# Patient Record
Sex: FEMALE | Race: WHITE | NOT HISPANIC OR LATINO | Employment: FULL TIME | URBAN - METROPOLITAN AREA
[De-identification: names, ages, dates, MRNs, and addresses within clinical notes are randomized per-mention and may not be internally consistent; named-entity substitution may affect disease eponyms.]

---

## 2017-01-05 ENCOUNTER — TRANSFERRED RECORDS (OUTPATIENT)
Dept: SURGERY | Facility: CLINIC | Age: 47
End: 2017-01-05

## 2017-04-05 ENCOUNTER — TRANSFERRED RECORDS (OUTPATIENT)
Dept: SURGERY | Facility: CLINIC | Age: 47
End: 2017-04-05

## 2017-06-28 ENCOUNTER — TRANSFERRED RECORDS (OUTPATIENT)
Dept: SURGERY | Facility: CLINIC | Age: 47
End: 2017-06-28

## 2017-07-31 ENCOUNTER — APPOINTMENT (OUTPATIENT)
Age: 47
Setting detail: DERMATOLOGY
End: 2017-08-19

## 2017-07-31 DIAGNOSIS — D18.0 HEMANGIOMA: ICD-10-CM

## 2017-07-31 DIAGNOSIS — D22 MELANOCYTIC NEVI: ICD-10-CM

## 2017-07-31 DIAGNOSIS — L73.8 OTHER SPECIFIED FOLLICULAR DISORDERS: ICD-10-CM

## 2017-07-31 DIAGNOSIS — L82.1 OTHER SEBORRHEIC KERATOSIS: ICD-10-CM

## 2017-07-31 DIAGNOSIS — L72.0 EPIDERMAL CYST: ICD-10-CM

## 2017-07-31 DIAGNOSIS — L70.0 ACNE VULGARIS: ICD-10-CM

## 2017-07-31 DIAGNOSIS — L81.4 OTHER MELANIN HYPERPIGMENTATION: ICD-10-CM

## 2017-07-31 PROBLEM — D18.01 HEMANGIOMA OF SKIN AND SUBCUTANEOUS TISSUE: Status: ACTIVE | Noted: 2017-07-31

## 2017-07-31 PROBLEM — D22.5 MELANOCYTIC NEVI OF TRUNK: Status: ACTIVE | Noted: 2017-07-31

## 2017-07-31 PROCEDURE — OTHER MIPS QUALITY: OTHER

## 2017-07-31 PROCEDURE — 99214 OFFICE O/P EST MOD 30 MIN: CPT

## 2017-07-31 PROCEDURE — OTHER DEFER: OTHER

## 2017-07-31 PROCEDURE — OTHER PRESCRIPTION: OTHER

## 2017-07-31 PROCEDURE — OTHER COUNSELING: OTHER

## 2017-07-31 RX ORDER — TRETINOIN 0.25 MG/G
0.025% CREAM TOPICAL QHS
Qty: 45 | Refills: 2 | Status: ERX | COMMUNITY
Start: 2017-07-31

## 2017-07-31 ASSESSMENT — LOCATION DETAILED DESCRIPTION DERM
LOCATION DETAILED: RIGHT INFERIOR UPPER BACK
LOCATION DETAILED: RIGHT MEDIAL UPPER BACK
LOCATION DETAILED: RIGHT MEDIAL UPPER BACK
LOCATION DETAILED: SUPERIOR THORACIC SPINE
LOCATION DETAILED: LEFT INFERIOR FOREHEAD
LOCATION DETAILED: RIGHT MEDIAL FOREHEAD

## 2017-07-31 ASSESSMENT — LOCATION ZONE DERM
LOCATION ZONE: TRUNK
LOCATION ZONE: TRUNK
LOCATION ZONE: FACE

## 2017-07-31 ASSESSMENT — LOCATION SIMPLE DESCRIPTION DERM
LOCATION SIMPLE: RIGHT UPPER BACK
LOCATION SIMPLE: LEFT FOREHEAD
LOCATION SIMPLE: RIGHT FOREHEAD
LOCATION SIMPLE: RIGHT UPPER BACK
LOCATION SIMPLE: UPPER BACK

## 2017-07-31 NOTE — PROCEDURE: DEFER
Detail Level: Zone
Procedure To Be Performed At Next Visit: Laser: Pulse dye laser 595nm
Instructions (Optional): [facility]

## 2017-07-31 NOTE — PROCEDURE: MIPS QUALITY
Detail Level: Detailed
Quality 130: Documentation Of Current Medications In The Medical Record: Current Medications Documented
Quality 110: Preventive Care And Screening: Influenza Immunization: Influenza Immunization previously received during influenza season
Quality 226: Preventive Care And Screening: Tobacco Use: Screening And Cessation Intervention: Patient screened for tobacco and never smoked
Quality 431: Preventive Care And Screening: Unhealthy Alcohol Use - Screening: Patient screened for unhealthy alcohol use using a single question and scores less than 2 times per year
Quality 131: Pain Assessment And Follow-Up: Pain assessment using a standardized tool is documented as negative, no follow-up plan required

## 2017-09-20 ENCOUNTER — TRANSFERRED RECORDS (OUTPATIENT)
Dept: SURGERY | Facility: CLINIC | Age: 47
End: 2017-09-20

## 2017-12-15 ENCOUNTER — TRANSFERRED RECORDS (OUTPATIENT)
Dept: SURGERY | Facility: CLINIC | Age: 47
End: 2017-12-15

## 2018-03-15 ENCOUNTER — TRANSFERRED RECORDS (OUTPATIENT)
Dept: SURGERY | Facility: CLINIC | Age: 48
End: 2018-03-15

## 2018-06-19 ENCOUNTER — TRANSFERRED RECORDS (OUTPATIENT)
Dept: SURGERY | Facility: CLINIC | Age: 48
End: 2018-06-19

## 2018-09-11 ENCOUNTER — TRANSFERRED RECORDS (OUTPATIENT)
Dept: SURGERY | Facility: CLINIC | Age: 48
End: 2018-09-11

## 2018-11-19 ENCOUNTER — OFFICE VISIT (OUTPATIENT)
Dept: ENDOCRINOLOGY | Facility: CLINIC | Age: 48
End: 2018-11-19
Payer: COMMERCIAL

## 2018-11-19 VITALS
DIASTOLIC BLOOD PRESSURE: 67 MMHG | HEIGHT: 65 IN | HEART RATE: 55 BPM | BODY MASS INDEX: 23.63 KG/M2 | WEIGHT: 141.8 LBS | SYSTOLIC BLOOD PRESSURE: 107 MMHG

## 2018-11-19 DIAGNOSIS — E03.9 HYPOTHYROIDISM, UNSPECIFIED TYPE: Primary | ICD-10-CM

## 2018-11-19 DIAGNOSIS — Z83.3 FAMILY HISTORY OF DIABETES MELLITUS: ICD-10-CM

## 2018-11-19 LAB
GLUCOSE SERPL-MCNC: 88 MG/DL (ref 70–99)
T4 FREE SERPL-MCNC: 0.84 NG/DL (ref 0.76–1.46)
TSH SERPL DL<=0.005 MIU/L-ACNC: 5.15 MU/L (ref 0.4–4)

## 2018-11-19 PROCEDURE — 36415 COLL VENOUS BLD VENIPUNCTURE: CPT | Performed by: INTERNAL MEDICINE

## 2018-11-19 PROCEDURE — 82947 ASSAY GLUCOSE BLOOD QUANT: CPT | Performed by: INTERNAL MEDICINE

## 2018-11-19 PROCEDURE — 86376 MICROSOMAL ANTIBODY EACH: CPT | Performed by: INTERNAL MEDICINE

## 2018-11-19 PROCEDURE — 84439 ASSAY OF FREE THYROXINE: CPT | Performed by: INTERNAL MEDICINE

## 2018-11-19 PROCEDURE — 99204 OFFICE O/P NEW MOD 45 MIN: CPT | Performed by: INTERNAL MEDICINE

## 2018-11-19 PROCEDURE — 84443 ASSAY THYROID STIM HORMONE: CPT | Performed by: INTERNAL MEDICINE

## 2018-11-19 RX ORDER — VENLAFAXINE 75 MG/1
TABLET ORAL
COMMUNITY
Start: 2018-11-12

## 2018-11-19 NOTE — PROGRESS NOTES
Name: Heather Barros is a 48 year old, seen at the request of Dr. Ana Rosa Bueno for evaluation of     Chief Complaint   Patient presents with     Thyroid Problem     low TSH level at OBGYN clinic       HPI:  Recent issues:  Here for further evaluation of recent abnormal thyroid test and symptoms  Had recommendation to see endocrinologist by Dr. AMANDA Bueno  She also learned of our clinic from her father, Mr Davidson Swanson.        11/2014. Previous diagnosis of breast cancer  Had medical evaluation with Dr. Toya Hernandez/[facility]  Double mastectomy surgery, then chemotherapy and radiation treatments  Details of breast cancer diagnosis, management, and lab testing not available.    7/19/18. Medical evaluation with Dr. Ana Rosa Bueno/OBGYN  Progressive symptoms of fatigue, weight gain, constipation  Labs:  TSH 4.75 uIU/mL (nl 0.36-3.74), Free T4 0.81 ng/dL, Free T3 2.56 pg/mL  No previous history of thyroid goiter, nodules, or thyroid medication use  Fam Hx:   Hypothyroidism Sister   Parathyroid dis MGM  Other chronic illnesses include:   Breast cancer:   Takes Zoladex and Aromasin, followed by medical oncologist   Depression:     Takes Effexor medication, followed by PCP/GYN   Anemia   Followed by PCP/GYN      , lives in [facility], works at [employer]  Sees Dr. Ana Rosa Bueno for general medicine evaluations.  Has also seen Dr. Toya Hernandez/[facility]    PMH/PSH:  Past Medical History:   Diagnosis Date     Acne      Anemia     slight     Anxiety      Breast cancer (H)      Breast mass      Depression      Drug-induced osteoporosis      Dyspareunia      Polyarthralgia      Ulcer      Past Surgical History:   Procedure Laterality Date     BIOPSY       DISSECT LYMPH NODE AXILLA Right 12/3/2014    Procedure: DISSECT LYMPH NODE AXILLA;  Surgeon: Padmini James MD;  Location: [facility] OR     GENITOURINARY SURGERY      10 yrs old  Reimplantation of bilateral ureters     INSERT PORT VASCULAR  ACCESS Left 12/22/2014    Procedure: INSERT PORT VASCULAR ACCESS;  Surgeon: Padmini aJmes MD;  Location: [facility] OR     INSERT TISSUE EXPANDER BREAST BILATERAL Bilateral 12/3/2014    Procedure: INSERT TISSUE EXPANDER BREAST BILATERAL;  Surgeon: Lizy Livingston MD;  Location: [facility] OR     MASTECTOMY SIMPLE BILATERAL, SENTINEL NODE BILATERAL, COMBINED Bilateral 12/3/2014    Procedure: COMBINED MASTECTOMY SIMPLE BILATERAL, SENTINEL NODE BILATERAL;  Surgeon: Padmini James MD;  Location: [facility] OR     RECONSTRUCT BREAST BILATERAL, IMPLANT PROSTHESIS BILATERAL, COMBINED Bilateral 12/21/2015    Procedure: COMBINED RECONSTRUCT BREAST BILATERAL, IMPLANT PROSTHESIS BILATERAL;  Surgeon: Lizy Livingston MD;  Location: [facility]     REMOVE PORT VASCULAR ACCESS N/A 1/15/2016    Procedure: REMOVE PORT VASCULAR ACCESS;  Surgeon: Billy Steele MD;  Location: [facility]       Family Hx:  Family History   Problem Relation Age of Onset     Diabetes Father      Thyroid Disease Sister          Social Hx:  Social History     Social History     Marital status:      Spouse name: N/A     Number of children: N/A     Years of education: N/A     Occupational History     Not on file.     Social History Main Topics     Smoking status: Former Smoker     Smokeless tobacco: Never Used      Comment: quit after college     Alcohol use Yes      Comment: 3/week     Drug use: No     Sexual activity: Not on file     Other Topics Concern     Not on file     Social History Narrative          MEDICATIONS:  has a current medication list which includes the following prescription(s): acetaminophen, calcium carb-cholecalciferol, exemestane, fexofenadine hcl, ibuprofen, magnesium, ospemifene, venlafaxine, venlafaxine, and goserelin acetate.    ROS:     ROS: 10 point ROS neg other than the symptoms noted above in the HPI.    GENERAL:  fatigue, wt gain 10#/1 yr; denies fevers, chills, night sweats.   HEENT: no dysphagia, odonophagia, diplopia, neck  "pain  THYROID:  no apparent hyper or hypothyroid symptoms  CV: no chest pain, pressure, palpitations  LUNGS: no SOB, CHARLES, cough, wheezing   ABDOMEN: constipation; denies diarrhea, nausea, abdominal pain  EXTREMITIES: no rashes, ulcers, edema  NEUROLOGY: no headaches, denies changes in vision, tingling, extremitiy numbness   MSK: no muscle aches or pains, weakness  SKIN: some acne, denies rash  : no menses, has hot flashes  PSYCH:  stable mood, no significant anxiety or depression  ENDOCRINE: no heat or cold intolerance    Physical Exam   VS: /67  Pulse 55  Ht 1.651 m (5' 5\")  Wt 64.3 kg (141 lb 12.8 oz)  BMI 23.6 kg/m2  GENERAL: AXOX3, NAD, well dressed, answering questions appropriately, appears stated age.  THYROID:  normal gland, no apparent nodules or goiter  HEENT: neck non-tender, no exopthalmous, no proptosis, EOMI  CV: RRR, no rubs, gallops, no murmurs  LUNGS: CTAB, no wheezes, rales, or ronchi  ABDOMEN: soft, nontender, nondistended  EXTREMITIES: no edema, +pedal pulses, no lesions  NEUROLOGY: CN grossly intact, no tremors  MSK: grossly intact  SKIN: no apparent facial acne, rashes or skin lesions    LABS:    All pertinent notes, labs, and images personally reviewed by me.     A/P:  Encounter Diagnoses   Name Primary?     Hypothyroidism, unspecified type Yes     Family history of diabetes mellitus      Comments:  Reviewed health history and hypothyroidism issues.  Recent thyroid tests at OBGYN clinic and symptoms indicate mild primary hypothyroidism    Plan:  Discussed general issues with the hypothyroidism diagnosis and management  Reviewed thyroid gland anatomy and hormone physiology  Discussed lab tests used to assess patient thyroid hormone levels  Reviewed treatment options including levothyroxine medication, ideal dosing    Recommend:  Repeat thyroid tests, including TPOAb  Reviewed management option using low dose levothyroxine medication  Monitor for symptom changes  Will summarize test " results and recommendations soon  Contact me if questions about evaluation and plan    Discussed importance of having a primary care practitioner for general medicine appointments and also acute care visits.  Addressed patient questions today    Labs ordered today:   Orders Placed This Encounter   Procedures     T4 free     TSH     Thyroid peroxidase antibody     Glucose     Radiology/Consults ordered today: None    More than 50% of the time spent with Mrs. Barros on counseling / coordinating her care.  Total appointment time was 50 minutes.    Follow-up:  ALBERT Babin MD  Endocrinology  [facility]  CC: China Bueno

## 2018-11-19 NOTE — MR AVS SNAPSHOT
"              After Visit Summary   11/19/2018    Heather Barros    MRN: [id number]           Patient Information     Date Of Birth          1970        Visit Information        Provider Department      11/19/2018 10:30 AM Mamadou Babin MD [facility]        Today's Diagnoses     Hypothyroidism, unspecified type    -  1    Family history of diabetes mellitus           Follow-ups after your visit        Who to contact     If you have questions or need follow up information about today's clinic visit or your schedule please contact [facility] directly at [phone].  Normal or non-critical lab and imaging results will be communicated to you by MyChart, letter or phone within 4 business days after the clinic has received the results. If you do not hear from us within 7 days, please contact the clinic through MyChart or phone. If you have a critical or abnormal lab result, we will notify you by phone as soon as possible.  Submit refill requests through [employer] or call your pharmacy and they will forward the refill request to us. Please allow 3 business days for your refill to be completed.          Additional Information About Your Visit        Care EveryWhere ID     This is your Care EveryWhere ID. This could be used by other organizations to access your [facility] medical records  [id number]        Your Vitals Were     Pulse Height BMI (Body Mass Index)             55 1.651 m (5' 5\") 23.6 kg/m2          Blood Pressure from Last 3 Encounters:   11/19/18 107/67   01/15/16 106/58   12/21/15 122/77    Weight from Last 3 Encounters:   11/19/18 64.3 kg (141 lb 12.8 oz)   01/15/16 61.1 kg (134 lb 12.8 oz)   12/21/15 60.6 kg (133 lb 11.2 oz)              We Performed the Following     Glucose     T4 free     Thyroid peroxidase antibody     TSH        Primary Care Provider Office Phone # Fax #    China Miriam Bueno [phone] [address] " [phone]        Equal Access to Services     [facility] : Hadii aad ku hadkiarrajj Taylerali, wamejiada angelocrissyha, qasusanneta kacarlosangel alanis, tanja baugh. So [facility] [phone].    ATENCIÓN: Si habla español, tiene a ann disposición servicios gratuitos de asistencia lingüística. Dottieame al [phone].    We comply with applicable federal civil rights laws and [place] laws. We do not discriminate on the basis of race, color, national origin, age, disability, sex, sexual orientation, or gender identity.            Thank you!     Thank you for choosing [facility]  for your care. Our goal is always to provide you with excellent care. Hearing back from our patients is one way we can continue to improve our services. Please take a few minutes to complete the written survey that you may receive in the mail after your visit with us. Thank you!             Your Updated Medication List - Protect others around you: Learn how to safely use, store and throw away your medicines at www.disposemymeds.org.          This list is accurate as of 11/19/18  6:58 PM.  Always use your most recent med list.                   Brand Name Dispense Instructions for use Diagnosis    ADVIL PO      Take 800 mg by mouth every 8 hours as needed for moderate pain        ALLEGRA ALLERGY PO      Take 60 mg by mouth daily as needed        Calcium Carb-Cholecalciferol 600-800 MG-UNIT Tabs      Take by mouth daily        exemestane 25 MG tablet    AROMASIN     Take 25 mg by mouth daily        magnesium 250 MG tablet      Take 400 tablets by mouth daily        ospemifene 60 MG tablet    OSPHENA     Take 60 mg by mouth every morning        TYLENOL PO      Take 500 mg by mouth as needed for mild pain or fever        * venlafaxine 37.5 MG 24 hr capsule    EFFEXOR-XR     Take 75 mg by mouth daily        * venlafaxine 75 MG tablet    EFFEXOR          ZOLADEX SC      Inject Subcutaneous every 3 months        * Notice:  This  list has 2 medication(s) that are the same as other medications prescribed for you. Read the directions carefully, and ask your doctor or other care provider to review them with you.

## 2018-11-20 LAB — THYROPEROXIDASE AB SERPL-ACNC: <10 IU/ML

## 2018-11-23 DIAGNOSIS — E03.9 HYPOTHYROIDISM, UNSPECIFIED TYPE: Primary | ICD-10-CM

## 2018-11-23 RX ORDER — LEVOTHYROXINE SODIUM 25 UG/1
25 TABLET ORAL DAILY
Qty: 30 TABLET | Refills: 11 | Status: SHIPPED | OUTPATIENT
Start: 2018-11-23 | End: 2020-01-29

## 2018-12-04 ENCOUNTER — TRANSFERRED RECORDS (OUTPATIENT)
Dept: SURGERY | Facility: CLINIC | Age: 48
End: 2018-12-04

## 2019-02-25 ENCOUNTER — TRANSFERRED RECORDS (OUTPATIENT)
Dept: SURGERY | Facility: CLINIC | Age: 49
End: 2019-02-25

## 2019-08-30 ENCOUNTER — TRANSFERRED RECORDS (OUTPATIENT)
Dept: SURGERY | Facility: CLINIC | Age: 49
End: 2019-08-30

## 2020-05-08 ENCOUNTER — VIRTUAL VISIT (OUTPATIENT)
Dept: ENDOCRINOLOGY | Facility: CLINIC | Age: 50
End: 2020-05-08
Payer: COMMERCIAL

## 2020-05-08 DIAGNOSIS — E03.9 HYPOTHYROIDISM, UNSPECIFIED TYPE: Primary | ICD-10-CM

## 2020-05-08 DIAGNOSIS — E03.9 HYPOTHYROIDISM, UNSPECIFIED TYPE: ICD-10-CM

## 2020-05-08 PROCEDURE — 99213 OFFICE O/P EST LOW 20 MIN: CPT | Mod: 95 | Performed by: INTERNAL MEDICINE

## 2020-05-08 RX ORDER — LEVOTHYROXINE SODIUM 25 UG/1
25 TABLET ORAL DAILY
Qty: 30 TABLET | Refills: 2 | Status: SHIPPED | OUTPATIENT
Start: 2020-05-08 | End: 2020-07-29

## 2020-05-08 RX ORDER — TAMOXIFEN CITRATE 20 MG/1
TABLET ORAL
COMMUNITY
Start: 2020-02-16 | End: 2024-05-22

## 2020-05-08 NOTE — PROGRESS NOTES
"Heather Barros is a 49 year old female who is being evaluated via a billable video visit.      The patient has been notified of following:     \"This video visit will be conducted via a call between you and your physician/provider. We have found that certain health care needs can be provided without the need for an in-person physical exam.  This service lets us provide the care you need with a video conversation.  If a prescription is necessary we can send it directly to your pharmacy.  If lab work is needed we can place an order for that and you can then stop by our lab to have the test done at a later time.    Video visits are billed at different rates depending on your insurance coverage.  Please reach out to your insurance provider with any questions.    If during the course of the call the physician/provider feels a video visit is not appropriate, you will not be charged for this service.\"    Patient has given verbal consent for Video visit? Yes    How would you like to obtain your AVS? Reviewed verbally    Patient would like the video invitation sent by: Text to cell phone: [phone]    Will anyone else be joining your video visit? no      Recent issues:  Thyroid followup.  Patient feeling well overall, success with wt loss on levothyroxine med plan            11/2014. Previous diagnosis of breast cancer  Had medical evaluation with Dr. Toya Hernandez/[facility]  Double mastectomy surgery, then chemotherapy and radiation treatments  Details of breast cancer diagnosis, management, and lab testing not available.     7/19/18. Medical evaluation with Dr. Ana Rosa Bueno/OBGYN  Progressive symptoms of fatigue, weight gain, constipation  Labs:  TSH 4.75 uIU/mL (nl 0.36-3.74), Free T4 0.81 ng/dL, Free T3 2.56 pg/mL  No previous history of thyroid goiter, nodules, or thyroid medication use  Fam Hx:              Hypothyroidism           Sister              Parathyroid dis            MGM    Recent [facility] labs include:  Lab " Results   Component Value Date    TSH 5.15 (H) 11/19/2018    T4 0.84 11/19/2018    TPO <10 11/19/2018     Current dose:  Levothyroxine 0.025 mg daily        , lives in [facility], works at [employer]  Sees Dr. Ana Rosa Bueno for general medicine evaluations.  Has also seen Dr. Toya Hernandez/[facility]      ROS: 10 point ROS neg other than the symptoms noted above in the HPI.     GENERAL:  less, intentional wt loss then some recent wt regain; denies fevers, chills, night sweats.   HEENT: no dysphagia, odonophagia, diplopia, neck pain  THYROID:  no apparent hyper or hypothyroid symptoms  CV: no chest pain, pressure, palpitations  LUNGS: no SOB, CHARLES, cough, wheezing   ABDOMEN: constipation; denies diarrhea, nausea, abdominal pain  EXTREMITIES: occasional left hand tremors; no rashes, ulcers, edema  NEUROLOGY: no headaches, denies changes in vision, tingling, extremitiy numbness   MSK: no muscle aches or pains, weakness  SKIN: some acne, denies rash  : no menses, has hot flashes  PSYCH:  stable mood, no significant anxiety or depression  ENDOCRINE: no heat or cold intolerance     Physical Exam (visual exam)  VS:  no vital signs taken for video visit  GENERAL: healthy, alert and NAD, well dressed, answering questions appropriately  EYES: eyeglasses, eyes grossly normal to inspection, conjunctivae and sclerae normal, no exophthalmos or proptosis  THYROID:  no apparent nodules or goiter  LUNGS: no audible wheeze, cough or visible cyanosis, no visible retractions or increased work of breathing  ABDOMEN: abdomen normal size  EXTREMITIES: no hand tremors, limited exam  NEUROLOGY: CN grossly intact, mentation intact and speech normal   PSYCH: mentation appears normal, affect normal/bright, judgement and insight intact, normal speech and appearance well groomed       LABS:     All pertinent notes, labs, and images personally reviewed by me.      A/P:       Encounter Diagnoses   Name      Hypothyroidism,  unspecified type      Family history of diabetes mellitus      Comments:  Reviewed health history and hypothyroidism issues.     Plan:  Discussed general issues with the hypothyroidism diagnosis and management  Discussed lab tests used to assess patient thyroid hormone levels  Reviewed treatment options including levothyroxine medication, ideal dosing     Recommend:  Continue current levothyroxine 0.025 mg daily dose plan  Will update this Rx to her pharmacy today  Plan lab appt at her nearby [facility] lab soon, lab orders placed  Monitor for symptom changes  We discussed general issues with her father's diabetes management also  Contact me if questions about evaluation and plan     Discussed importance of having a primary care practitioner for general medicine appointments and also acute care visits.  Addressed patient questions today        More than 50% of the time spent with Mrs. Barros on counseling / coordinating her care.  Total appointment time was 22 minutes.     Follow-up:  1 yr     DARRYL Babin MD, MS  Endocrinology  [facility]    CC:  TRINA Bueno      Video-Visit Details    Type of service:  Video Visit    Video Start Time: 8:58 am  Video End Time: 9:20 am    Originating Location (pt. Location): home    Distant Location (provider location):  [facility]     Platform used for Video Visit: [employer]

## 2020-07-27 ENCOUNTER — TELEPHONE (OUTPATIENT)
Dept: ENDOCRINOLOGY | Facility: CLINIC | Age: 50
End: 2020-07-27

## 2020-07-27 DIAGNOSIS — E03.9 HYPOTHYROIDISM, UNSPECIFIED TYPE: ICD-10-CM

## 2020-07-29 DIAGNOSIS — E03.9 HYPOTHYROIDISM, UNSPECIFIED TYPE: ICD-10-CM

## 2020-07-29 RX ORDER — LEVOTHYROXINE SODIUM 25 UG/1
25 TABLET ORAL DAILY
Qty: 30 TABLET | Refills: 3 | Status: SHIPPED | OUTPATIENT
Start: 2020-07-29 | End: 2020-09-15

## 2020-08-01 RX ORDER — LEVOTHYROXINE SODIUM 25 UG/1
TABLET ORAL
Qty: 30 TABLET | Refills: 0 | Status: SHIPPED | OUTPATIENT
Start: 2020-08-01 | End: 2020-08-31

## 2020-08-01 NOTE — TELEPHONE ENCOUNTER
I received a refill request for patient's levothyroxine 0.025 mg medication, and have now refilled it #30, R0.  At the 5/2020 appt, she was advised to have a lab appointment at her nearby [facility] lab and the lab orders were placed.  She has not done this and I have not seen any thyroid lab results since 2018.    Please contact patient to remind her to do the lab appt for thyroid testing ASAP.  I will not be able to do additional levothyroxine Rx's if I don't have thyroid lab results to confirm the appropriate dose.  Thanks.    DARRYL Babin MD, MS  Endocrinology  [facility]

## 2020-08-03 NOTE — TELEPHONE ENCOUNTER
Patient notified and agrees to get her labs done asap (prior to further refill)    Bryanna Marie RN on 8/3/2020 at 9:37 AM

## 2020-08-28 DIAGNOSIS — E03.9 HYPOTHYROIDISM, UNSPECIFIED TYPE: ICD-10-CM

## 2020-08-28 LAB
T4 FREE SERPL-MCNC: 1.05 NG/DL (ref 0.76–1.46)
TSH SERPL DL<=0.005 MIU/L-ACNC: 5.05 MU/L (ref 0.4–4)

## 2020-08-28 PROCEDURE — 36415 COLL VENOUS BLD VENIPUNCTURE: CPT | Performed by: INTERNAL MEDICINE

## 2020-08-28 PROCEDURE — 84439 ASSAY OF FREE THYROXINE: CPT | Performed by: INTERNAL MEDICINE

## 2020-08-28 PROCEDURE — 84443 ASSAY THYROID STIM HORMONE: CPT | Performed by: INTERNAL MEDICINE

## 2020-09-10 ENCOUNTER — TELEPHONE (OUTPATIENT)
Dept: ENDOCRINOLOGY | Facility: CLINIC | Age: 50
End: 2020-09-10

## 2020-09-10 DIAGNOSIS — E03.9 HYPOTHYROIDISM, UNSPECIFIED TYPE: ICD-10-CM

## 2020-09-10 NOTE — TELEPHONE ENCOUNTER
Reason for Call:  Other     Detailed comments: pt called back regarding note on last results. She would like to talk to Dr Babin (or team) regarding the recommended dose increase. She wants to clarify how to increase the dose (mentioned alternating days at different doses)    Phone Number Patient can be reached at: [phone]      Best Time: any (please no today 9/10)    Can we leave a detailed message on this number? YES    Call taken on 9/10/2020 at 12:42 PM by Cash Nieto

## 2020-09-10 NOTE — TELEPHONE ENCOUNTER
Called Pt:    No answer, left VM to return call to clinic.     Erin ORDOÑEZ RN         From Lab Letter dated 8/31/2020:     These results indicate your overall thyroid hormone levels are borderline low.  Management options include 1) continuing the current levothyroxine 0.025 mg daily dose or 2) dose increase to levothyroxine 0.0375 mg (alternating the levothyroxine 0.025 mg as 1 tablet vs 2-tablets every-other-day).       Contact me with your thyroid medication dose preferences soon.     DARRYL Babin MD, MS  Endocrinology  [facility]

## 2020-09-15 NOTE — TELEPHONE ENCOUNTER
Spoke with patient     She is wondering if instead of alternating 0.025mg with 1 tablet vs 2 tablets every other day, if she can just do the 2 tablets Mon, Wed, Fri, then 1 tablet other days of the week (would think this is okay as the total dosing she is getting is between the 1 tablet every day and 1 tab alternating with 2 tabs every other day?) - this is because     She would only need a call back if this is not okay, otherwise, new dosing pended - she'd like an updated Rx sent     Thank you  Shoshana STANTON RN

## 2020-09-17 RX ORDER — LEVOTHYROXINE SODIUM 25 UG/1
TABLET ORAL
Qty: 40 TABLET | Refills: 5 | Status: SHIPPED | OUTPATIENT
Start: 2020-09-17 | End: 2021-03-31

## 2020-09-17 NOTE — TELEPHONE ENCOUNTER
Message noted.  I agree with this modified thyroid med dose plan as listed.  I have sent the updated levothyroxine Rx to her pharmacy, then called her and left a voice mail message with this plan.    DARRYL Babin MD, MS  Endocrinology  [facility]

## 2021-03-31 ENCOUNTER — IMMUNIZATION (OUTPATIENT)
Dept: PEDIATRICS | Facility: CLINIC | Age: 51
End: 2021-03-31
Payer: COMMERCIAL

## 2021-03-31 DIAGNOSIS — E03.9 HYPOTHYROIDISM, UNSPECIFIED TYPE: ICD-10-CM

## 2021-03-31 PROCEDURE — 91300 PR COVID VAC PFIZER DIL RECON 30 MCG/0.3 ML IM: CPT

## 2021-03-31 PROCEDURE — 0001A PR COVID VAC PFIZER DIL RECON 30 MCG/0.3 ML IM: CPT

## 2021-03-31 NOTE — TELEPHONE ENCOUNTER
LOV 5-8-2020 TL, follow up one year  No future OV scheduled    Pharm note given  Does not appear patient uses her My[facility]    Yoselin Cunningham, RT (R)

## 2021-04-01 NOTE — TELEPHONE ENCOUNTER
"Routing refill request to provider for review/approval because:  Labs out of range:  TSH        Requested Prescriptions   Pending Prescriptions Disp Refills     levothyroxine (SYNTHROID/LEVOTHROID) 25 MCG tablet 40 tablet 1     Sig: Take 2 tablets (50mcg) by mouth on Mondays, Wednesdays, Fridays, and 1 tablet all other days       Thyroid Protocol Failed - 3/31/2021  9:45 AM        Failed - Normal TSH on file in past 12 months     Recent Labs   Lab Test 08/28/20  1446   TSH 5.05*              Passed - Patient is 12 years or older        Passed - Recent (12 mo) or future (30 days) visit within the authorizing provider's specialty     Patient has had an office visit with the authorizing provider or a provider within the authorizing providers department within the previous 12 mos or has a future within next 30 days. See \"Patient Info\" tab in inbasket, or \"Choose Columns\" in Meds & Orders section of the refill encounter.              Passed - Medication is active on med list        Passed - No active pregnancy on record     If patient is pregnant or has had a positive pregnancy test, please check TSH.          Passed - No positive pregnancy test in past 12 months     If patient is pregnant or has had a positive pregnancy test, please check TSH.               "

## 2021-04-02 RX ORDER — LEVOTHYROXINE SODIUM 25 UG/1
TABLET ORAL
Qty: 120 TABLET | Refills: 0 | Status: SHIPPED | OUTPATIENT
Start: 2021-04-02 | End: 2021-06-09

## 2021-04-21 ENCOUNTER — IMMUNIZATION (OUTPATIENT)
Dept: PEDIATRICS | Facility: CLINIC | Age: 51
End: 2021-04-21
Attending: INTERNAL MEDICINE
Payer: COMMERCIAL

## 2021-04-21 PROCEDURE — 91300 PR COVID VAC PFIZER DIL RECON 30 MCG/0.3 ML IM: CPT

## 2021-04-21 PROCEDURE — 0002A PR COVID VAC PFIZER DIL RECON 30 MCG/0.3 ML IM: CPT

## 2021-05-15 ENCOUNTER — HEALTH MAINTENANCE LETTER (OUTPATIENT)
Age: 51
End: 2021-05-15

## 2021-06-03 ENCOUNTER — TELEPHONE (OUTPATIENT)
Dept: ENDOCRINOLOGY | Facility: CLINIC | Age: 51
End: 2021-06-03

## 2021-06-03 DIAGNOSIS — E03.9 HYPOTHYROIDISM, UNSPECIFIED TYPE: ICD-10-CM

## 2021-06-03 NOTE — TELEPHONE ENCOUNTER
Last Written Prescription Date:  4/2/2021  Last Fill Quantity: 120,  # refills: 0   Last office visit: Visit date not found with prescribing provider:  5/08/2020   Future Office Visit:        Requested Prescriptions   Pending Prescriptions Disp Refills     levothyroxine (SYNTHROID/LEVOTHROID) 25 MCG tablet 120 tablet 0     Sig: Take 2 tablets (50mcg) by mouth on Mondays, Wednesdays, Fridays, and 1 tablet all other days       There is no refill protocol information for this order

## 2021-06-09 RX ORDER — LEVOTHYROXINE SODIUM 25 UG/1
TABLET ORAL
Qty: 126 TABLET | Refills: 0 | Status: SHIPPED | OUTPATIENT
Start: 2021-06-09 | End: 2021-06-26

## 2021-06-21 DIAGNOSIS — E03.9 HYPOTHYROIDISM, UNSPECIFIED TYPE: ICD-10-CM

## 2021-06-21 LAB
T4 FREE SERPL-MCNC: 0.92 NG/DL (ref 0.76–1.46)
TSH SERPL DL<=0.005 MIU/L-ACNC: 3.22 MU/L (ref 0.4–4)

## 2021-06-21 PROCEDURE — 36415 COLL VENOUS BLD VENIPUNCTURE: CPT | Performed by: INTERNAL MEDICINE

## 2021-06-21 PROCEDURE — 84443 ASSAY THYROID STIM HORMONE: CPT | Performed by: INTERNAL MEDICINE

## 2021-06-21 PROCEDURE — 84439 ASSAY OF FREE THYROXINE: CPT | Performed by: INTERNAL MEDICINE

## 2021-06-26 DIAGNOSIS — E03.9 HYPOTHYROIDISM, UNSPECIFIED TYPE: ICD-10-CM

## 2021-06-26 RX ORDER — LEVOTHYROXINE SODIUM 50 UG/1
TABLET ORAL
Qty: 30 TABLET | Refills: 5 | Status: SHIPPED | OUTPATIENT
Start: 2021-06-26 | End: 2021-07-21

## 2021-06-29 RX ORDER — LEVOTHYROXINE SODIUM 50 UG/1
TABLET ORAL
Qty: 90 TABLET | OUTPATIENT
Start: 2021-06-29

## 2021-07-21 ENCOUNTER — VIRTUAL VISIT (OUTPATIENT)
Dept: ENDOCRINOLOGY | Facility: CLINIC | Age: 51
End: 2021-07-21
Payer: COMMERCIAL

## 2021-07-21 DIAGNOSIS — E03.9 HYPOTHYROIDISM, UNSPECIFIED TYPE: Primary | ICD-10-CM

## 2021-07-21 PROCEDURE — 99213 OFFICE O/P EST LOW 20 MIN: CPT | Mod: 95 | Performed by: INTERNAL MEDICINE

## 2021-07-21 RX ORDER — LEVOTHYROXINE SODIUM 50 UG/1
TABLET ORAL
Qty: 90 TABLET | Refills: 3 | Status: SHIPPED | OUTPATIENT
Start: 2021-07-21 | End: 2022-07-22

## 2021-07-21 NOTE — PROGRESS NOTES
Patient is being evaluated via a billable video visit.      How would you like to obtain your AVS? Reviewed verbally  If the video visit is dropped, the invitation should be resent by cell phone  Will anyone else be joining your video visit? no      Video Start Time:  2:30 pm    Video-Visit Details    Type of service:  Video Visit    Video End Time:  2:43 pm    Originating Location (pt. Location): home    Distant Location (provider location):  [facility]/home    Platform used for Video Visit:  [employer] Mobile        Recent issues:  Thyroid followup.  Patient feeling well overall, initial success with wt loss on levothyroxine med plan  No new health issues reported          11/2014. Previous diagnosis of breast cancer  Had medical evaluation with Dr. Toya Hernandez/[facility]  Double mastectomy surgery, then chemotherapy and radiation treatments  Details of breast cancer diagnosis, management, and lab testing not available.     7/19/18. Medical evaluation with Dr. Ana Rosa Bueno/[facility]  Progressive symptoms of fatigue, weight gain, constipation  Labs:  TSH 4.75 uIU/mL (nl 0.36-3.74), Free T4 0.81 ng/dL, Free T3 2.56 pg/mL  No previous history of thyroid goiter, nodules, or thyroid medication use  Fam Hx:              Hypothyroidism           Sister              Parathyroid dis            MGM      11/19/18. Initial thyroid evaluation with me at [place]  Reviewed health history and thyroid issues  Started low dose levothyroxine, then gradual dose increases    Recent [facility] labs include:  Lab Results   Component Value Date    TSH 3.22 06/21/2021    T4 0.92 06/21/2021    TPO <10 11/19/2018     Current dose:  Levothyroxine 0.05 mg daily        , lives in [facility], works at [employer] in [place]  Sees Dr. Ana Rosa Bueno for general medicine evaluations.  Has also seen Dr. Toya Hernandez/[facility]      ROS: 10 point ROS neg other than the symptoms noted above in the HPI.     GENERAL:   energy good, wt stable; denies fevers, chills, night sweats.   HEENT: no dysphagia, odonophagia, diplopia, neck pain  THYROID:  no apparent hyper or hypothyroid symptoms  CV: no chest pain, pressure, palpitations  LUNGS: no SOB, CHARLES, cough, wheezing   ABDOMEN: previous constipation; denies diarrhea, nausea, abdominal pain  EXTREMITIES: occasional left hand tremors; no rashes, ulcers, edema  NEUROLOGY: no headaches, denies changes in vision, tingling, extremitiy numbness   MSK: no muscle aches or pains, weakness  SKIN: some acne, denies rash  : no menses, has hot flashes  PSYCH:  stable mood, no significant anxiety or depression  ENDOCRINE: no heat or cold intolerance     Physical Exam (visual exam)  VS:  no vital signs taken for video visit  CONSTITUTIONAL: healthy, alert and NAD, well dressed, answering questions appropriately  ENT: no nose swelling or nasal discharge, mouth redness or gum changes.  EYES: eyes grossly normal to inspection, conjunctivae and sclerae normal, no exophthalmos or proptosis  THYROID:  no apparent nodules or goiter  LUNGS: no audible wheeze, cough or visible cyanosis, no visible retractions or increased work of breathing  ABDOMEN: abdomen not evaluated  EXTREMITIES: no hand tremors, limited exam  NEUROLOGY: CN grossly intact, mentation intact and speech normal   SKIN:  no apparent skin lesions, rash, or edema with visualized skin appearance  PSYCH: mentation appears normal, affect normal/bright, judgement and insight intact,   normal speech and appearance well groomed       LABS:     All pertinent notes, labs, and images personally reviewed by me.     A/P:  Encounter Diagnosis   Name Primary?     Hypothyroidism, unspecified type Yes     Comments:  Reviewed health history and hypothyroidism issues.  Reviewed and interpreted tests that I previously ordered.   Ordered appropriate tests for the endocrinology disease management.    Management options discussed and implemented after shared  medical decision making with the patient.  Hypothyroidism problem is chronic-stable     Plan:  Discussed general issues with the hypothyroidism diagnosis and management  Discussed lab tests used to assess patient thyroid hormone levels  Reviewed treatment options including levothyroxine medication, ideal dosing     Recommend:  Continue current levothyroxine 0.05 mg daily dose plan  Will update this Rx to her pharmacy today  Plan repeat thyroid lab testing in late 9/2021 or early 10/2021   Discussed the nearby [facility] lab soon   Lab orders placed  Monitor for symptom changes  Keep focus on diet, exercise, weight management  Contact me if questions about evaluation and plan     Addressed patient questions today     There are no Patient Instructions on file for this visit.    Future labs ordered today:   Orders Placed This Encounter   Procedures     TSH     T4 free     Radiology/Consults ordered today: None    Total time spent in with the patient evaluation:  13 min  Additional time spent reviewing pertinent lab tests and chart notes, and documentation:  9 min    Follow-up:  7/2022    DARRYL Babin MD, MS  Endocrinology  [facility]

## 2021-07-21 NOTE — LETTER
7/21/2021         RE: Heather Barros  [address]  [place] [address]        Dear Colleague,    Thank you for referring your patient, Heather Barros, to the [facility]. Please see a copy of my visit note below.    Patient is being evaluated via a billable video visit.      How would you like to obtain your AVS? Reviewed verbally  If the video visit is dropped, the invitation should be resent by cell phone  Will anyone else be joining your video visit? no      Video Start Time:  2:30 pm    Video-Visit Details    Type of service:  Video Visit    Video End Time:  2:43 pm    Originating Location (pt. Location): home    Distant Location (provider location):  [facility]/home    Platform used for Video Visit:  Next Caller        Recent issues:  Thyroid followup.  Patient feeling well overall, initial success with wt loss on levothyroxine med plan  No new health issues reported          11/2014. Previous diagnosis of breast cancer  Had medical evaluation with Dr. Toya Hernandez/[facility]  Double mastectomy surgery, then chemotherapy and radiation treatments  Details of breast cancer diagnosis, management, and lab testing not available.     7/19/18. Medical evaluation with Dr. Ana Rosa Bueno/[facility]  Progressive symptoms of fatigue, weight gain, constipation  Labs:  TSH 4.75 uIU/mL (nl 0.36-3.74), Free T4 0.81 ng/dL, Free T3 2.56 pg/mL  No previous history of thyroid goiter, nodules, or thyroid medication use  Fam Hx:              Hypothyroidism           Sister              Parathyroid dis            MGM      11/19/18. Initial thyroid evaluation with me at [place]  Reviewed health history and thyroid issues  Started low dose levothyroxine, then gradual dose increases    Recent [facility] labs include:  Lab Results   Component Value Date    TSH 3.22 06/21/2021    T4 0.92 06/21/2021    TPO <10 11/19/2018     Current dose:  Levothyroxine 0.05 mg  daily        , lives in [facility], works at [employer] in [place]  Sees Dr. Ana Rosa Bueno for general medicine evaluations.  Has also seen Dr. Toya Hernandez/[facility]      ROS: 10 point ROS neg other than the symptoms noted above in the HPI.     GENERAL:  energy good, wt stable; denies fevers, chills, night sweats.   HEENT: no dysphagia, odonophagia, diplopia, neck pain  THYROID:  no apparent hyper or hypothyroid symptoms  CV: no chest pain, pressure, palpitations  LUNGS: no SOB, CHARLES, cough, wheezing   ABDOMEN: previous constipation; denies diarrhea, nausea, abdominal pain  EXTREMITIES: occasional left hand tremors; no rashes, ulcers, edema  NEUROLOGY: no headaches, denies changes in vision, tingling, extremitiy numbness   MSK: no muscle aches or pains, weakness  SKIN: some acne, denies rash  : no menses, has hot flashes  PSYCH:  stable mood, no significant anxiety or depression  ENDOCRINE: no heat or cold intolerance     Physical Exam (visual exam)  VS:  no vital signs taken for video visit  CONSTITUTIONAL: healthy, alert and NAD, well dressed, answering questions appropriately  ENT: no nose swelling or nasal discharge, mouth redness or gum changes.  EYES: eyes grossly normal to inspection, conjunctivae and sclerae normal, no exophthalmos or proptosis  THYROID:  no apparent nodules or goiter  LUNGS: no audible wheeze, cough or visible cyanosis, no visible retractions or increased work of breathing  ABDOMEN: abdomen not evaluated  EXTREMITIES: no hand tremors, limited exam  NEUROLOGY: CN grossly intact, mentation intact and speech normal   SKIN:  no apparent skin lesions, rash, or edema with visualized skin appearance  PSYCH: mentation appears normal, affect normal/bright, judgement and insight intact,   normal speech and appearance well groomed       LABS:     All pertinent notes, labs, and images personally reviewed by me.     A/P:  Encounter Diagnosis   Name Primary?     Hypothyroidism,  unspecified type Yes     Comments:  Reviewed health history and hypothyroidism issues.  Reviewed and interpreted tests that I previously ordered.   Ordered appropriate tests for the endocrinology disease management.    Management options discussed and implemented after shared medical decision making with the patient.  Hypothyroidism problem is chronic-stable     Plan:  Discussed general issues with the hypothyroidism diagnosis and management  Discussed lab tests used to assess patient thyroid hormone levels  Reviewed treatment options including levothyroxine medication, ideal dosing     Recommend:  Continue current levothyroxine 0.05 mg daily dose plan  Will update this Rx to her pharmacy today  Plan repeat thyroid lab testing in late 9/2021 or early 10/2021   Discussed the nearby [facility] lab soon   Lab orders placed  Monitor for symptom changes  Keep focus on diet, exercise, weight management  Contact me if questions about evaluation and plan     Addressed patient questions today     There are no Patient Instructions on file for this visit.    Future labs ordered today:   Orders Placed This Encounter   Procedures     TSH     T4 free     Radiology/Consults ordered today: None    Total time spent in with the patient evaluation:  13 min  Additional time spent reviewing pertinent lab tests and chart notes, and documentation:  9 min    Follow-up:  7/2022    DARRYL Babin MD, MS  Endocrinology  [facility]                      Again, thank you for allowing me to participate in the care of your patient.        Sincerely,        Mamadou Babin MD

## 2021-08-09 DIAGNOSIS — E03.9 HYPOTHYROIDISM, UNSPECIFIED TYPE: ICD-10-CM

## 2021-08-09 RX ORDER — LEVOTHYROXINE SODIUM 25 UG/1
TABLET ORAL
Qty: 126 TABLET | Refills: 0 | OUTPATIENT
Start: 2021-08-09

## 2021-09-04 ENCOUNTER — HEALTH MAINTENANCE LETTER (OUTPATIENT)
Age: 51
End: 2021-09-04

## 2022-02-25 ENCOUNTER — TRANSFERRED RECORDS (OUTPATIENT)
Dept: SURGERY | Facility: CLINIC | Age: 52
End: 2022-02-25
Payer: COMMERCIAL

## 2022-06-11 ENCOUNTER — HEALTH MAINTENANCE LETTER (OUTPATIENT)
Age: 52
End: 2022-06-11

## 2022-07-22 DIAGNOSIS — E03.9 HYPOTHYROIDISM, UNSPECIFIED TYPE: ICD-10-CM

## 2022-07-22 RX ORDER — LEVOTHYROXINE SODIUM 50 UG/1
TABLET ORAL
Qty: 90 TABLET | Refills: 3 | Status: CANCELLED | OUTPATIENT
Start: 2022-07-22

## 2022-07-22 RX ORDER — LEVOTHYROXINE SODIUM 50 UG/1
TABLET ORAL
Qty: 90 TABLET | Refills: 0 | Status: SHIPPED | OUTPATIENT
Start: 2022-07-22 | End: 2022-10-28

## 2022-07-22 NOTE — TELEPHONE ENCOUNTER
"Requested Prescriptions   Pending Prescriptions Disp Refills     levothyroxine (SYNTHROID/LEVOTHROID) 50 MCG tablet 90 tablet 3     Sig: Take 1-tablet (50 mcg) by mouth daily as directed       Thyroid Protocol Failed - 7/22/2022 10:13 AM        Failed - Recent (12 mo) or future (30 days) visit within the authorizing provider's specialty     Patient has had an office visit with the authorizing provider or a provider within the authorizing providers department within the previous 12 mos or has a future within next 30 days. See \"Patient Info\" tab in inbasket, or \"Choose Columns\" in Meds & Orders section of the refill encounter.              Failed - Normal TSH on file in past 12 months     Recent Labs   Lab Test 06/21/21  1143   TSH 3.22              Passed - Patient is 12 years or older        Passed - Medication is active on med list        Passed - No active pregnancy on record     If patient is pregnant or has had a positive pregnancy test, please check TSH.          Passed - No positive pregnancy test in past 12 months     If patient is pregnant or has had a positive pregnancy test, please check TSH.             Last office visit 7/21/21  No future appts scheduled.  Pt is due back this month.  Will send refill with note to pharmacy that appt needed.  Linsey Miller RN    "

## 2022-07-22 NOTE — TELEPHONE ENCOUNTER
Last Written Prescription Date:  7/21/2021  Last Fill Quantity: 90,  # refills: 3   Last office visit: Visit date not found with prescribing provider:     Future Office Visit:        Requested Prescriptions   Pending Prescriptions Disp Refills     levothyroxine (SYNTHROID/LEVOTHROID) 50 MCG tablet 90 tablet 3     Sig: Take 1-tablet (50 mcg) by mouth daily as directed       There is no refill protocol information for this order

## 2022-10-16 ENCOUNTER — HEALTH MAINTENANCE LETTER (OUTPATIENT)
Age: 52
End: 2022-10-16

## 2022-10-27 DIAGNOSIS — E03.9 HYPOTHYROIDISM, UNSPECIFIED TYPE: ICD-10-CM

## 2022-10-27 NOTE — TELEPHONE ENCOUNTER
Last Written Prescription Date:  7/22/2022  Last Fill Quantity: 90,  # refills: 0   Last office visit: Visit date not found with prescribing provider:  7/21/2021    Future Office Visit:      Requested Prescriptions   Pending Prescriptions Disp Refills     levothyroxine (SYNTHROID/LEVOTHROID) 50 MCG tablet 90 tablet 0     Sig: Take 1-tablet (50 mcg) by mouth daily as directed       There is no refill protocol information for this order

## 2022-10-28 RX ORDER — LEVOTHYROXINE SODIUM 50 UG/1
TABLET ORAL
Qty: 90 TABLET | Refills: 0 | Status: SHIPPED | OUTPATIENT
Start: 2022-10-28 | End: 2022-12-04

## 2022-10-28 NOTE — TELEPHONE ENCOUNTER
"Requested Prescriptions   Pending Prescriptions Disp Refills     levothyroxine (SYNTHROID/LEVOTHROID) 50 MCG tablet 90 tablet 0     Sig: Take 1-tablet (50 mcg) by mouth daily as directed       Thyroid Protocol Failed - 10/27/2022  3:45 PM        Failed - Recent (12 mo) or future (30 days) visit within the authorizing provider's specialty     Patient has had an office visit with the authorizing provider or a provider within the authorizing providers department within the previous 12 mos or has a future within next 30 days. See \"Patient Info\" tab in inbasket, or \"Choose Columns\" in Meds & Orders section of the refill encounter.              Failed - Normal TSH on file in past 12 months     Recent Labs   Lab Test 06/21/21  1143   TSH 3.22              Passed - Patient is 12 years or older        Passed - Medication is active on med list        Passed - No active pregnancy on record     If patient is pregnant or has had a positive pregnancy test, please check TSH.          Passed - No positive pregnancy test in past 12 months     If patient is pregnant or has had a positive pregnancy test, please check TSH.           .  Future appt: 11/1/22  Refill sent per protocol  Linsey Miller RN    "

## 2022-11-01 ENCOUNTER — VIRTUAL VISIT (OUTPATIENT)
Dept: ENDOCRINOLOGY | Facility: CLINIC | Age: 52
End: 2022-11-01
Payer: COMMERCIAL

## 2022-11-01 DIAGNOSIS — E03.9 HYPOTHYROIDISM, UNSPECIFIED TYPE: Primary | ICD-10-CM

## 2022-11-01 PROCEDURE — 99213 OFFICE O/P EST LOW 20 MIN: CPT | Mod: 95 | Performed by: INTERNAL MEDICINE

## 2022-11-01 NOTE — LETTER
11/1/2022         RE: Heather Barros  [address]  [place] [address]        Dear Colleague,    Thank you for referring your patient, Heather Barros, to the [facility]. Please see a copy of my visit note below.    Patient is being evaluated via a billable video visit.      How would you like to obtain your AVS? Verbally Reviewed  If the video visit is dropped, the invitation should be resent by: Cellphone  Will anyone else be joining your video visit? No        Video-Visit Details    Video Start Time:  3:00 pm    Type of service:  Video Visit    Video End Time:  3:15 pm    Originating Location (pt. Location): Home    PROVIDER LOCATION On-site vs Off-site    Distant Location (provider location):  Home    Platform used for Video Visit: Truist        Recent issues:  Thyroid followup.  Taking the levothyroxine medication regularly  Patient feeling well overall, no new health issues reported          11/2014. Previous diagnosis of breast cancer  Had medical evaluation with Dr. Toya Hernandez/[facility]  Double mastectomy surgery, then chemotherapy and radiation treatments  Details of breast cancer diagnosis, management, and lab testing not available.     7/19/18. Medical evaluation with Dr. Ana Rosa Bueno/[facility]  Progressive symptoms of fatigue, weight gain, constipation  Labs:  TSH 4.75 uIU/mL (nl 0.36-3.74), Free T4 0.81 ng/dL, Free T3 2.56 pg/mL  No previous history of thyroid goiter, nodules, or thyroid medication use  Fam Hx:              Hypothyroidism           Sister              Parathyroid dis            MGM      11/19/18. Initial thyroid evaluation with me at [place]  Reviewed health history and thyroid issues  Started low dose levothyroxine, then gradual dose increases  Recent [facility] labs include:  Lab Results   Component Value Date    TSH 3.22 06/21/2021    T4 0.92 06/21/2021    TPO <10 11/19/2018     Current dose:  Levothyroxine 0.05 mg daily        , lives  in [facility], previously worked at [employer] in [place]  Sees Dr. Ana Rosa Bueno for general medicine evaluations.  Has also seen Dr. Toya Hernandez/[facility]      PMH/PSH:  Past Medical History:   Diagnosis Date     Acne      Anemia     slight     Anxiety      Breast cancer (H)      Breast mass      Depression      Drug-induced osteoporosis      Dyspareunia      Hypothyroidism      Polyarthralgia      Ulcer      Past Surgical History:   Procedure Laterality Date     BIOPSY       DISSECT LYMPH NODE AXILLA Right 12/3/2014    Procedure: DISSECT LYMPH NODE AXILLA;  Surgeon: Padmini James MD;  Location: [facility] OR     GENITOURINARY SURGERY      10 yrs old  Reimplantation of bilateral ureters     INSERT PORT VASCULAR ACCESS Left 12/22/2014    Procedure: INSERT PORT VASCULAR ACCESS;  Surgeon: Padmini James MD;  Location: [facility] OR     INSERT TISSUE EXPANDER BREAST BILATERAL Bilateral 12/3/2014    Procedure: INSERT TISSUE EXPANDER BREAST BILATERAL;  Surgeon: Lizy Livingston MD;  Location: [facility] OR     MASTECTOMY SIMPLE BILATERAL, SENTINEL NODE BILATERAL, COMBINED Bilateral 12/3/2014    Procedure: COMBINED MASTECTOMY SIMPLE BILATERAL, SENTINEL NODE BILATERAL;  Surgeon: Padmini James MD;  Location: [facility] OR     RECONSTRUCT BREAST BILATERAL, IMPLANT PROSTHESIS BILATERAL, COMBINED Bilateral 12/21/2015    Procedure: COMBINED RECONSTRUCT BREAST BILATERAL, IMPLANT PROSTHESIS BILATERAL;  Surgeon: Lizy Livingston MD;  Location: [facility] [place]     REMOVE PORT VASCULAR ACCESS N/A 1/15/2016    Procedure: REMOVE PORT VASCULAR ACCESS;  Surgeon: Billy Steele MD;  Location: [facility] [place]       Family Hx:  Family History   Problem Relation Age of Onset     Diabetes Father      Thyroid Disease Sister          Social Hx:  Social History     Socioeconomic History     Marital status:      Spouse name: Not on file     Number of children: Not on file     Years of education: Not on file     Highest education level: Not on  file   Occupational History     Not on file   Tobacco Use     Smoking status: Former     Smokeless tobacco: Never     Tobacco comments:     quit after college   Substance and Sexual Activity     Alcohol use: Yes     Comment: 3/week     Drug use: No     Sexual activity: Not on file   Other Topics Concern     Parent/sibling w/ CABG, MI or angioplasty before 65F 55M? Not Asked   Social History Narrative     Not on file     Social Determinants of Health     Financial Resource Strain: Not on file   Food Insecurity: Not on file   Transportation Needs: Not on file   Physical Activity: Not on file   Stress: Not on file   Social Connections: Not on file   Intimate Partner Violence: Not on file   Housing Stability: Not on file          MEDICATIONS:  has a current medication list which includes the following prescription(s): acetaminophen, calcium carb-cholecalciferol, fexofenadine hcl, ibuprofen, levothyroxine, magnesium, venlafaxine, venlafaxine, and tamoxifen.    ROS: 10 point ROS neg other than the symptoms noted above in the HPI.     GENERAL:  energy good, wt stable; denies fevers, chills, night sweats.   HEENT: no dysphagia, odonophagia, diplopia, neck pain  THYROID:  no apparent hyper or hypothyroid symptoms  CV: no chest pain, pressure, palpitations  LUNGS: no SOB, CHARLES, cough, wheezing   ABDOMEN: constipation; denies diarrhea, nausea, abdominal pain  EXTREMITIES: occasional left hand tremors; no rashes, ulcers, edema  NEUROLOGY: no headaches, denies changes in vision, tingling, extremitiy numbness   MSK: no muscle aches or pains, weakness  SKIN: some acne, denies rash  : no menses, has hot flashes  PSYCH:  stable mood, no significant anxiety or depression  ENDOCRINE: no heat or cold intolerance     Physical Exam (visual exam)  VS:  no vital signs taken for video visit  CONSTITUTIONAL: healthy, alert and NAD, well dressed, answering questions appropriately  ENT: no nose swelling or nasal discharge, mouth redness or  gum changes.  EYES: eyes grossly normal to inspection, conjunctivae and sclerae normal, no exophthalmos or proptosis  THYROID:  no apparent nodules or goiter  LUNGS: no audible wheeze, cough or visible cyanosis, no visible retractions or increased work of breathing  ABDOMEN: abdomen not evaluated  EXTREMITIES: no hand tremors, limited exam  NEUROLOGY: CN grossly intact, mentation intact and speech normal   SKIN:  no apparent skin lesions, rash, or edema with visualized skin appearance  PSYCH: mentation appears normal, affect normal/bright, judgement and insight intact,   normal speech and appearance well groomed       LABS:     All pertinent notes, labs, and images personally reviewed by me.     A/P:  Encounter Diagnosis   Name Primary?     Hypothyroidism, unspecified type Yes       Comments:  Reviewed health history and hypothyroidism issues.  Reviewed and interpreted tests that I previously ordered.   Ordered appropriate tests for the endocrinology disease management.    Management options discussed and implemented after shared medical decision making with the patient.  Hypothyroidism problem is chronic-stable     Plan:  Reviewed general issues with the hypothyroidism diagnosis and management  Discussed lab tests used to assess patient thyroid hormone levels  Reviewed treatment options including levothyroxine medication, ideal dosing     Recommend:  Continue the current levothyroxine 0.05 mg daily dose plan  Plan repeat thyroid lab testing soon   Discussed the nearby [facility] lab   Lab orders placed  Monitor for symptom changes  Will need updated levothyroxine Rx after reviewing lab results  Keep focus on diet, exercise, weight management  No thyroid U/S imaging needed at this time  Contact me if questions about evaluation and plan     Discussed future option of endocrinology appt for her father Davidson Swanson  Addressed patient questions today     There are no Patient Instructions on file for this  visit.    Future labs ordered today:   Orders Placed This Encounter   Procedures     TSH     T4 free     Radiology/Consults ordered today: None    Total time spent on day of encounter:  20 min    Follow-up:  11/2023    DARRYL Babin MD, MS  Endocrinology  [facility]    CC:  TRINA Bueno                        Again, thank you for allowing me to participate in the care of your patient.        Sincerely,        Mamadou Babin MD

## 2022-11-01 NOTE — PROGRESS NOTES
Patient is being evaluated via a billable video visit.      How would you like to obtain your AVS? Verbally Reviewed  If the video visit is dropped, the invitation should be resent by: Cellphone  Will anyone else be joining your video visit? No        Video-Visit Details    Video Start Time:  3:00 pm    Type of service:  Video Visit    Video End Time:  3:15 pm    Originating Location (pt. Location): Home    PROVIDER LOCATION On-site vs Off-site    Distant Location (provider location):  Home    Platform used for Video Visit: [employer]        Recent issues:  Thyroid followup.  Taking the levothyroxine medication regularly  Patient feeling well overall, no new health issues reported          11/2014. Previous diagnosis of breast cancer  Had medical evaluation with Dr. Toya Hernandez/[facility]  Double mastectomy surgery, then chemotherapy and radiation treatments  Details of breast cancer diagnosis, management, and lab testing not available.     7/19/18. Medical evaluation with Dr. Ana Rosa Bueno/[facility]  Progressive symptoms of fatigue, weight gain, constipation  Labs:  TSH 4.75 uIU/mL (nl 0.36-3.74), Free T4 0.81 ng/dL, Free T3 2.56 pg/mL  No previous history of thyroid goiter, nodules, or thyroid medication use  Fam Hx:              Hypothyroidism           Sister              Parathyroid dis            MGM      11/19/18. 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 Ana Rosa Bueno for general medicine evaluations.  Has also seen Dr. Toya Hernandez/[facility]      PMH/PSH:  Past Medical History:   Diagnosis Date     Acne      Anemia     slight      Anxiety      Breast cancer (H)      Breast mass      Depression      Drug-induced osteoporosis      Dyspareunia      Hypothyroidism      Polyarthralgia      Ulcer      Past Surgical History:   Procedure Laterality Date     BIOPSY       DISSECT LYMPH NODE AXILLA Right 12/3/2014    Procedure: DISSECT LYMPH NODE AXILLA;  Surgeon: Padmini James MD;  Location: [facility]     GENITOURINARY SURGERY      10 yrs old  Reimplantation of bilateral ureters     INSERT PORT VASCULAR ACCESS Left 12/22/2014    Procedure: INSERT PORT VASCULAR ACCESS;  Surgeon: Padmini James MD;  Location: [facility]     INSERT TISSUE EXPANDER BREAST BILATERAL Bilateral 12/3/2014    Procedure: INSERT TISSUE EXPANDER BREAST BILATERAL;  Surgeon: Lizy Livingston MD;  Location: [facility]     MASTECTOMY SIMPLE BILATERAL, SENTINEL NODE BILATERAL, COMBINED Bilateral 12/3/2014    Procedure: COMBINED MASTECTOMY SIMPLE BILATERAL, SENTINEL NODE BILATERAL;  Surgeon: Padmini James MD;  Location: [facility]     RECONSTRUCT BREAST BILATERAL, IMPLANT PROSTHESIS BILATERAL, COMBINED Bilateral 12/21/2015    Procedure: COMBINED RECONSTRUCT BREAST BILATERAL, IMPLANT PROSTHESIS BILATERAL;  Surgeon: Lizy Livingston MD;  Location: [facility]     REMOVE PORT VASCULAR ACCESS N/A 1/15/2016    Procedure: REMOVE PORT VASCULAR ACCESS;  Surgeon: Billy Steele MD;  Location: [facility]       Family Hx:  Family History   Problem Relation Age of Onset     Diabetes Father      Thyroid Disease Sister          Social Hx:  Social History     Socioeconomic History     Marital status:      Spouse name: Not on file     Number of children: Not on file     Years of education: Not on file     Highest education level: Not on file   Occupational History     Not on file   Tobacco Use     Smoking status: Former     Smokeless tobacco: Never     Tobacco comments:     quit after college   Substance and Sexual Activity     Alcohol use: Yes     Comment: 3/week     Drug use: No     Sexual  activity: Not on file   Other Topics Concern     Parent/sibling w/ CABG, MI or angioplasty before 65F 55M? Not Asked   Social History Narrative     Not on file     Social Determinants of Health     Financial Resource Strain: Not on file   Food Insecurity: Not on file   Transportation Needs: Not on file   Physical Activity: Not on file   Stress: Not on file   Social Connections: Not on file   Intimate Partner Violence: Not on file   Housing Stability: Not on file          MEDICATIONS:  has a current medication list which includes the following prescription(s): acetaminophen, calcium carb-cholecalciferol, fexofenadine hcl, ibuprofen, levothyroxine, magnesium, venlafaxine, venlafaxine, and tamoxifen.    ROS: 10 point ROS neg other than the symptoms noted above in the HPI.     GENERAL:  energy good, wt stable; denies fevers, chills, night sweats.   HEENT: no dysphagia, odonophagia, diplopia, neck pain  THYROID:  no apparent hyper or hypothyroid symptoms  CV: no chest pain, pressure, palpitations  LUNGS: no SOB, CHARLES, cough, wheezing   ABDOMEN: constipation; denies diarrhea, nausea, abdominal pain  EXTREMITIES: occasional left hand tremors; no rashes, ulcers, edema  NEUROLOGY: no headaches, denies changes in vision, tingling, extremitiy numbness   MSK: no muscle aches or pains, weakness  SKIN: some acne, denies rash  : no menses, has hot flashes  PSYCH:  stable mood, no significant anxiety or depression  ENDOCRINE: no heat or cold intolerance     Physical Exam (visual exam)  VS:  no vital signs taken for video visit  CONSTITUTIONAL: healthy, alert and NAD, well dressed, answering questions appropriately  ENT: no nose swelling or nasal discharge, mouth redness or gum changes.  EYES: eyes grossly normal to inspection, conjunctivae and sclerae normal, no exophthalmos or proptosis  THYROID:  no apparent nodules or goiter  LUNGS: no audible wheeze, cough or visible cyanosis, no visible retractions or increased work of  breathing  ABDOMEN: abdomen not evaluated  EXTREMITIES: no hand tremors, limited exam  NEUROLOGY: CN grossly intact, mentation intact and speech normal   SKIN:  no apparent skin lesions, rash, or edema with visualized skin appearance  PSYCH: mentation appears normal, affect normal/bright, judgement and insight intact,   normal speech and appearance well groomed       LABS:     All pertinent notes, labs, and images personally reviewed by me.     A/P:  Encounter Diagnosis   Name Primary?     Hypothyroidism, unspecified type Yes       Comments:  Reviewed health history and hypothyroidism issues.  Reviewed and interpreted tests that I previously ordered.   Ordered appropriate tests for the endocrinology disease management.    Management options discussed and implemented after shared medical decision making with the patient.  Hypothyroidism problem is chronic-stable     Plan:  Reviewed general issues with the hypothyroidism diagnosis and management  Discussed lab tests used to assess patient thyroid hormone levels  Reviewed treatment options including levothyroxine medication, ideal dosing     Recommend:  Continue the current levothyroxine 0.05 mg daily dose plan  Plan repeat thyroid lab testing soon   Discussed the nearby [facility] lab   Lab orders placed  Monitor for symptom changes  Will need updated levothyroxine Rx after reviewing lab results  Keep focus on diet, exercise, weight management  No thyroid U/S imaging needed at this time  Contact me if questions about evaluation and plan     Discussed future option of endocrinology appt for her father Davidson Swanson  Addressed patient questions today     There are no Patient Instructions on file for this visit.    Future labs ordered today:   Orders Placed This Encounter   Procedures     TSH     T4 free     Radiology/Consults ordered today: None    Total time spent on day of encounter:  20 min    Follow-up:  11/2023    DARRYL Babin MD, MS  Endocrinology  [facility]  [facility]    CC:  TRINA Bueno

## 2022-12-02 ENCOUNTER — LAB (OUTPATIENT)
Dept: LAB | Facility: CLINIC | Age: 52
End: 2022-12-02
Payer: COMMERCIAL

## 2022-12-02 DIAGNOSIS — E03.9 HYPOTHYROIDISM, UNSPECIFIED TYPE: ICD-10-CM

## 2022-12-02 LAB
T4 FREE SERPL-MCNC: 0.87 NG/DL (ref 0.76–1.46)
TSH SERPL DL<=0.005 MIU/L-ACNC: 2.9 MU/L (ref 0.4–4)

## 2022-12-02 PROCEDURE — 84443 ASSAY THYROID STIM HORMONE: CPT

## 2022-12-02 PROCEDURE — 36415 COLL VENOUS BLD VENIPUNCTURE: CPT

## 2022-12-02 PROCEDURE — 84439 ASSAY OF FREE THYROXINE: CPT

## 2022-12-04 DIAGNOSIS — E03.9 HYPOTHYROIDISM, UNSPECIFIED TYPE: ICD-10-CM

## 2022-12-04 RX ORDER — LEVOTHYROXINE SODIUM 50 UG/1
TABLET ORAL
Qty: 90 TABLET | Refills: 3 | Status: SHIPPED | OUTPATIENT
Start: 2022-12-04 | End: 2024-01-22

## 2023-02-27 ENCOUNTER — TRANSFERRED RECORDS (OUTPATIENT)
Dept: SURGERY | Facility: CLINIC | Age: 53
End: 2023-02-27
Payer: COMMERCIAL

## 2023-03-06 ENCOUNTER — E-VISIT (OUTPATIENT)
Dept: URGENT CARE | Facility: CLINIC | Age: 53
End: 2023-03-06
Payer: COMMERCIAL

## 2023-03-06 DIAGNOSIS — J01.90 ACUTE SINUSITIS WITH SYMPTOMS > 10 DAYS: Primary | ICD-10-CM

## 2023-03-06 PROCEDURE — 99421 OL DIG E/M SVC 5-10 MIN: CPT | Performed by: INTERNAL MEDICINE

## 2023-03-06 NOTE — PATIENT INSTRUCTIONS
Sinusitis (Antibiotic Treatment)    The sinuses are air-filled spaces within the bones of the face. They connect to the inside of the nose. Sinusitis is an inflammation of the tissue that lines the sinuses. Sinusitis can occur during a cold. It can also happen due to allergies to pollens and other particles in the air. Sinusitis can cause symptoms of sinus congestion and a feeling of fullness. A sinus infection causes fever, headache, and facial pain. There is often green or yellow fluid draining from the nose or into the back of the throat (post-nasal drip). You have been given antibiotics to treat this condition.   Home care    Take the full course of antibiotics as instructed. Don't stop taking them, even when you feel better.    Drink plenty of water, hot tea, and other liquids as directed by the healthcare provider. This may help thin nasal mucus. It also may help your sinuses drain fluids.    Heat may help soothe painful areas of your face. Use a towel soaked in hot water. Or,  the shower and direct the warm spray onto your face. Using a vaporizer along with a menthol rub at night may also help soothe symptoms.     An expectorant with guaifenesin may help thin nasal mucus and help your sinuses drain fluids. Talk with your provider or pharmacists before taking an over-the-counter (OTC) medicine if you have any questions about it or its side effects..    You can use an OTC decongestant, unless a similar medicine was prescribed to you. Nasal sprays work the fastest. Use one that contains phenylephrine or oxymetazoline. First blow your nose gently. Then use the spray. Don't use these medicines more often than directed on the label. If you do, your symptoms may get worse. You may also take pills that contain pseudoephedrine. Don t use products that combine multiple medicines. This is because side effects may be increased. Read labels. You can also ask the pharmacist for help. (People with high blood  pressure should not use decongestants. They can raise blood pressure.) Talk with your provider or pharmacist if you have any questions about the medicine..    OTC antihistamines may help if allergies contributed to your sinusitis. Talk with your provider or pharmacist if you have any questions about the medicine..    Don't use nasal rinses or irrigation during an acute sinus infection, unless your healthcare provider tells you to. Rinsing may spread the infection to other areas in your sinuses.    Use acetaminophen or ibuprofen to control pain, unless another pain medicine was prescribed to you. If you have chronic liver or kidney disease or ever had a stomach ulcer, talk with your healthcare provider before using these medicines. Never give aspirin to anyone under age 18 who is ill with a fever. It may cause severe liver damage.    Don't smoke. This can make symptoms worse.    Follow-up care  Follow up with your healthcare provider, or as advised.   When to seek medical advice  Call your healthcare provider if any of these occur:     Facial pain or headache that gets worse    Stiff neck    Unusual drowsiness or confusion    Swelling of your forehead or eyelids    Symptoms don't go away in 10 days    Vision problems, such as blurred or double vision    Fever of 100.4 F (38 C) or higher, or as directed by your healthcare provider  Call 911  Call 911 if any of these occur:     Seizure    Trouble breathing    Feeling dizzy or faint    Fingernails, skin or lips look blue, purple , or gray  Prevention  Here are steps you can take to help prevent an infection:     Keep good hand washing habits.    Don t have close contact with people who have sore throats, colds, or other upper respiratory infections.    Don t smoke, and stay away from secondhand smoke.    Stay up to date with of your vaccines.  [employer] last reviewed this educational content on 12/1/2019 2000-2021 [employer]. All rights reserved. This  information is not intended as a substitute for professional medical care. Always follow your healthcare professional's instructions.        Dear Heather Barros    After reviewing your responses, I've been able to diagnose you with a sinus infection     Based on your responses and diagnosis, I have prescribed Augmentin   to treat your symptoms. I have sent this to your pharmacy.?     It is also important to stay well hydrated, get lots of rest and take over-the-counter decongestants,?tylenol?or ibuprofen if you?are able to?take those medications per your primary care provider to help relieve discomfort.?     It is important that you take?all of?your prescribed medication even if your symptoms are improving after a few doses.? Taking?all of?your medicine helps prevent the symptoms from returning.?     If your symptoms worsen, you develop severe headache, vomiting, high fever (>102), or are not improving in 7 days, please contact your primary care provider for an appointment or visit any of our convenient Walk-in Care or Urgent Care Centers to be seen which can be found on our website?here.?     Thanks again for choosing?us?as your health care partner,?   ?  Jacqueline Garibay MD?

## 2023-06-01 ENCOUNTER — HEALTH MAINTENANCE LETTER (OUTPATIENT)
Age: 53
End: 2023-06-01

## 2023-11-02 ENCOUNTER — LAB REQUISITION (OUTPATIENT)
Dept: LAB | Facility: CLINIC | Age: 53
End: 2023-11-02

## 2023-11-02 DIAGNOSIS — R35.0 FREQUENCY OF MICTURITION: ICD-10-CM

## 2023-11-02 PROCEDURE — 87086 URINE CULTURE/COLONY COUNT: CPT | Performed by: OBSTETRICS & GYNECOLOGY

## 2023-11-04 LAB — BACTERIA UR CULT: ABNORMAL

## 2023-12-06 ENCOUNTER — TRANSFERRED RECORDS (OUTPATIENT)
Dept: SURGERY | Facility: CLINIC | Age: 53
End: 2023-12-06
Payer: COMMERCIAL

## 2024-01-22 DIAGNOSIS — E03.9 HYPOTHYROIDISM, UNSPECIFIED TYPE: ICD-10-CM

## 2024-01-22 NOTE — TELEPHONE ENCOUNTER
Requested Prescriptions   Pending Prescriptions Disp Refills    levothyroxine (SYNTHROID/LEVOTHROID) 50 MCG tablet 90 tablet 3     Sig: Take 1-tablet (50 mcg) by mouth daily as directed       There is no refill protocol information for this order            Last Written Prescription Date:  12/4/2022  Last Fill Quantity: 90 tablet,  # refills: 3   Last office visit: Visit date not found ; last virtual visit: 11/1/2022 with prescribing provider:  Mamadou Babin MD    Future Office Visit: None

## 2024-01-23 DIAGNOSIS — E03.9 HYPOTHYROIDISM, UNSPECIFIED TYPE: ICD-10-CM

## 2024-01-23 RX ORDER — LEVOTHYROXINE SODIUM 50 UG/1
TABLET ORAL
Qty: 30 TABLET | Refills: 0 | Status: SHIPPED | OUTPATIENT
Start: 2024-01-23 | End: 2024-04-11

## 2024-01-23 RX ORDER — LEVOTHYROXINE SODIUM 50 UG/1
TABLET ORAL
Qty: 90 TABLET | OUTPATIENT
Start: 2024-01-23

## 2024-01-23 NOTE — TELEPHONE ENCOUNTER
1 month refill sent. Mychart sent to pt to notify that appt needed for add'l refills. Linsey Miller RN

## 2024-02-07 ENCOUNTER — MYC MEDICAL ADVICE (OUTPATIENT)
Dept: ENDOCRINOLOGY | Facility: CLINIC | Age: 54
End: 2024-02-07
Payer: COMMERCIAL

## 2024-02-07 DIAGNOSIS — E03.9 HYPOTHYROIDISM, UNSPECIFIED TYPE: ICD-10-CM

## 2024-02-07 NOTE — TELEPHONE ENCOUNTER
Requested Prescriptions   Pending Prescriptions Disp Refills    levothyroxine (SYNTHROID/LEVOTHROID) 50 MCG tablet [Pharmacy Med Name: LEVOTHYROXINE 0.05MG (50MCG) TAB] 30 tablet 0     Sig: TAKE 1 TABLET(50 MCG) BY MOUTH DAILY AS DIRECTED       Thyroid Protocol Failed - 2/7/2024  8:10 AM        Failed - Recent (12 mo) or future (30 days) visit within the authorizing provider's specialty     The patient must have completed an in-person or virtual visit within the past 12 months or has a future visit scheduled within the next 90 days with the authorizing provider s specialty.  Urgent care and e-visits do not quality as an office visit for this protocol.          Failed - Normal TSH on file in past 12 months     Recent Labs   Lab Test 12/02/22  1026   TSH 2.90              Passed - Patient is 12 years or older        Passed - Medication is active on med list        Passed - No active pregnancy on record     If patient is pregnant or has had a positive pregnancy test, please check TSH.          Passed - No positive pregnancy test in past 12 months     If patient is pregnant or has had a positive pregnancy test, please check TSH.             Last Written Prescription Date:  1/23/24  Last Fill Quantity: 30 tab,  # refills: 0   Last office visit: Visit date not found ; last virtual visit: 11/1/2022 with prescribing provider:  Dr Babin   Future Office Visit:  Overdue as of 12/2023     Patient overdue for both labs and provider visit. Attempted to call patient to schedule but patient did not answer. LVM with instructions to read MCM sent today 2/7/24.    Demarcus Arreola RN

## 2024-02-12 RX ORDER — LEVOTHYROXINE SODIUM 50 UG/1
TABLET ORAL
Qty: 30 TABLET | Refills: 0 | OUTPATIENT
Start: 2024-02-12

## 2024-02-12 NOTE — TELEPHONE ENCOUNTER
Patient has not responded to VM or [employer] message. Rx declined. Patient needs follow-up.    Demarcus Arreola RN

## 2024-04-04 ENCOUNTER — LAB REQUISITION (OUTPATIENT)
Dept: LAB | Facility: CLINIC | Age: 54
End: 2024-04-04

## 2024-04-04 DIAGNOSIS — R63.5 ABNORMAL WEIGHT GAIN: ICD-10-CM

## 2024-04-04 DIAGNOSIS — Z01.419 ENCOUNTER FOR GYNECOLOGICAL EXAMINATION (GENERAL) (ROUTINE) WITHOUT ABNORMAL FINDINGS: ICD-10-CM

## 2024-04-04 LAB — HBA1C MFR BLD: 5.8 %

## 2024-04-04 PROCEDURE — G0145 SCR C/V CYTO,THINLAYER,RESCR: HCPCS | Performed by: OBSTETRICS & GYNECOLOGY

## 2024-04-04 PROCEDURE — 83036 HEMOGLOBIN GLYCOSYLATED A1C: CPT | Performed by: OBSTETRICS & GYNECOLOGY

## 2024-04-04 PROCEDURE — 87624 HPV HI-RISK TYP POOLED RSLT: CPT | Performed by: OBSTETRICS & GYNECOLOGY

## 2024-04-08 LAB
BKR LAB AP GYN ADEQUACY: NORMAL
BKR LAB AP GYN INTERPRETATION: NORMAL
BKR LAB AP HPV REFLEX: NORMAL
BKR LAB AP LMP: NORMAL
BKR LAB AP PREVIOUS ABNL DX: NORMAL
BKR LAB AP PREVIOUS ABNORMAL: NORMAL
PATH REPORT.COMMENTS IMP SPEC: NORMAL
PATH REPORT.COMMENTS IMP SPEC: NORMAL
PATH REPORT.RELEVANT HX SPEC: NORMAL

## 2024-04-09 LAB
HUMAN PAPILLOMA VIRUS 16 DNA: NEGATIVE
HUMAN PAPILLOMA VIRUS 18 DNA: NEGATIVE
HUMAN PAPILLOMA VIRUS FINAL DIAGNOSIS: NORMAL
HUMAN PAPILLOMA VIRUS OTHER HR: NEGATIVE

## 2024-04-10 DIAGNOSIS — E03.9 HYPOTHYROIDISM, UNSPECIFIED TYPE: ICD-10-CM

## 2024-04-10 RX ORDER — LEVOTHYROXINE SODIUM 50 UG/1
TABLET ORAL
Qty: 30 TABLET | Refills: 0 | OUTPATIENT
Start: 2024-04-10

## 2024-04-10 NOTE — TELEPHONE ENCOUNTER
Medication Question or Refill    Contacts         Type Contact Phone/Fax    04/10/2024 12:38 PM CDT Interface (Incoming) [id number] - [place], [place] - [address] [facility] [address] [phone]            What medication are you calling about (include dose and sig)?: Levothyroxine Sodium    Preferred Pharmacy:     [id number] - [place], [place] - [address] AT [facility] [address]  [facility] [address]  Phone: [phone] Fax: [phone]      Controlled Substance Agreement on file:   CSA -- Patient Level:    CSA: None found at the patient level.       Who prescribed the medication?: Dr. Babin    Do you need a refill? Yes      Do you have any questions or concerns?  Yes: Patient states that the refill was denied last time, wants to know if she can have prescribing MD changed       Could we send this information to you in [facility] or would you prefer to receive a phone call?:   Patient would prefer a phone call   Okay to leave a detailed message?: Yes at Cell number on file:    Telephone Information:   Mobile [phone]

## 2024-04-10 NOTE — TELEPHONE ENCOUNTER
Patient called and aware she needs to make a follow up appointment and get labs drawn in order to get refill approval for levothyroxine. Previous lab orders pended. Please review.     Zohreh Beard RN

## 2024-04-11 DIAGNOSIS — E03.9 HYPOTHYROIDISM, UNSPECIFIED TYPE: ICD-10-CM

## 2024-04-11 RX ORDER — LEVOTHYROXINE SODIUM 50 UG/1
TABLET ORAL
Qty: 30 TABLET | Refills: 1 | Status: SHIPPED | OUTPATIENT
Start: 2024-04-11 | End: 2024-05-22

## 2024-04-11 NOTE — TELEPHONE ENCOUNTER
The patient was able to schedule her follow up for 5/22/24. However, in the meantime she still needs a refill for this medication, The patient was hoping now that she's schedule if Dr. Rahman could enter the refill, please review and follow up thank you.

## 2024-04-11 NOTE — TELEPHONE ENCOUNTER
Levothyroxine refill denied.  Patient's last evaluation with me was long ago... 11/1/22 and no follow-up appointments.  She should have the levothyroxine refilled by PCP at this time.    DARRYL Babin MD, MS  Endocrinology  [facility]

## 2024-04-12 RX ORDER — LEVOTHYROXINE SODIUM 50 UG/1
TABLET ORAL
Qty: 90 TABLET | OUTPATIENT
Start: 2024-04-12

## 2024-04-12 RX ORDER — LEVOTHYROXINE SODIUM 50 UG/1
TABLET ORAL
Qty: 30 TABLET | Refills: 0 | OUTPATIENT
Start: 2024-04-12

## 2024-04-12 NOTE — TELEPHONE ENCOUNTER
Rec'd 90 day refill request for levo from [facility]- declined and notified that pt needs to come for appt next month to get add'l refills. Linsey Miller RN

## 2024-04-12 NOTE — TELEPHONE ENCOUNTER
Since patient has scheduled a follow-up endocrinology evaluation, I have signed the levothyroxine Rx (#30, R1) and placed thyroid lab orders.    DARRYL Babin MD, MS  Endocrinology  [facility]

## 2024-05-22 ENCOUNTER — VIRTUAL VISIT (OUTPATIENT)
Dept: ENDOCRINOLOGY | Facility: CLINIC | Age: 54
End: 2024-05-22
Payer: COMMERCIAL

## 2024-05-22 DIAGNOSIS — E03.9 HYPOTHYROIDISM, UNSPECIFIED TYPE: Primary | ICD-10-CM

## 2024-05-22 PROCEDURE — 99213 OFFICE O/P EST LOW 20 MIN: CPT | Mod: 95 | Performed by: INTERNAL MEDICINE

## 2024-05-22 PROCEDURE — G2211 COMPLEX E/M VISIT ADD ON: HCPCS | Mod: 95 | Performed by: INTERNAL MEDICINE

## 2024-05-22 RX ORDER — FEZOLINETANT 45 MG/1
TABLET, FILM COATED ORAL
COMMUNITY
Start: 2024-05-22

## 2024-05-22 RX ORDER — GABAPENTIN 300 MG/1
300 CAPSULE ORAL 3 TIMES DAILY
COMMUNITY
Start: 2024-05-22

## 2024-05-22 RX ORDER — LEVOTHYROXINE SODIUM 50 UG/1
TABLET ORAL
Qty: 30 TABLET | Refills: 0 | Status: SHIPPED | OUTPATIENT
Start: 2024-05-22 | End: 2024-06-22

## 2024-05-22 NOTE — PROGRESS NOTES
Virtual Visit Details    Type of service:  Video Visit     Originating Location (pt. Location): Home  Distant Location (provider location):  Off-site  Platform used for Video Visit: Johnathan      Recent issues:  Thyroid followup, last appt with me 11/1/22  Taking the levothyroxine medication regularly  Patient feeling well overall, but issues with hot flashes   Taking gabapentin and Veozah medications for this, per medical oncologist  Recalls being told of mildly elevated hgbA1c and prediabetes, per GYN          11/2014. Previous diagnosis of breast cancer  Had medical evaluation with Dr. Toya Hernandez/[facility]  Double mastectomy surgery, then chemotherapy and radiation treatments  Details of breast cancer diagnosis, management, and lab testing not available.     7/19/18. Medical evaluation with Dr. Ana Rosa Bueno/[facility]  Progressive symptoms of fatigue, weight gain, constipation  Labs:  TSH 4.75 uIU/mL (nl 0.36-3.74), Free T4 0.81 ng/dL, Free T3 2.56 pg/mL  No previous history of thyroid goiter, nodules, or thyroid medication use  Fam Hx:              Hypothyroidism           Sister              Parathyroid dis            MGM      11/19/18. Initial thyroid evaluation with me at [facility]  Reviewed health history and thyroid issues  Started low dose levothyroxine, then gradual dose increases  11/2022- 5/2024. 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 Ivette Gerber/[facility] for general medicine evaluations.  Also sees Dr. Diaz/[facility].      PMH/PSH:  Past Medical History:   Diagnosis Date    Acne     Anemia     slight    Anxiety     Breast cancer (H)     Breast mass     Depression     Drug-induced osteoporosis     Dyspareunia     History of colonic polyps      Hypothyroidism     Polyarthralgia     Prediabetes 04/2024    Ulcer      Past Surgical History:   Procedure Laterality Date    BIOPSY      DISSECT LYMPH NODE AXILLA Right 12/3/2014    Procedure: DISSECT LYMPH NODE AXILLA;  Surgeon: Padmini James MD;  Location: [facility]    GENITOURINARY SURGERY      10 yrs old  Reimplantation of bilateral ureters    INSERT PORT VASCULAR ACCESS Left 12/22/2014    Procedure: INSERT PORT VASCULAR ACCESS;  Surgeon: Padmini James MD;  Location: [facility]    INSERT TISSUE EXPANDER BREAST BILATERAL Bilateral 12/3/2014    Procedure: INSERT TISSUE EXPANDER BREAST BILATERAL;  Surgeon: Lizy Livingston MD;  Location: [facility]    MASTECTOMY SIMPLE BILATERAL, SENTINEL NODE BILATERAL, COMBINED Bilateral 12/3/2014    Procedure: COMBINED MASTECTOMY SIMPLE BILATERAL, SENTINEL NODE BILATERAL;  Surgeon: Padmini James MD;  Location: [facility]    RECONSTRUCT BREAST BILATERAL, IMPLANT PROSTHESIS BILATERAL, COMBINED Bilateral 12/21/2015    Procedure: COMBINED RECONSTRUCT BREAST BILATERAL, IMPLANT PROSTHESIS BILATERAL;  Surgeon: Lizy Livingston MD;  Location: [facility]    REMOVE PORT VASCULAR ACCESS N/A 1/15/2016    Procedure: REMOVE PORT VASCULAR ACCESS;  Surgeon: Billy Steele MD;  Location: [facility]       Family Hx:  Family History   Problem Relation Age of Onset    Diabetes Father     Thyroid Disease Sister          Social Hx:  Social History     Socioeconomic History    Marital status:      Spouse name: Not on file    Number of children: Not on file    Years of education: Not on file    Highest education level: Not on file   Occupational History    Not on file   Tobacco Use    Smoking status: Former    Smokeless tobacco: Never    Tobacco comments:     quit after college   Substance and Sexual Activity    Alcohol use: Yes     Comment: 3/week    Drug use: No    Sexual activity: Not on file   Other Topics Concern    Parent/sibling w/ CABG, MI or angioplasty before 65F 55M? Not Asked    Social History Narrative    Not on file     Social Determinants of Health     Financial Resource Strain: Not on file   Food Insecurity: Not on file   Transportation Needs: Not on file   Physical Activity: Not on file   Stress: Not on file   Social Connections: Unknown (6/30/2022)    [facility]    Social Connections     Frequency of Communication with Friends and Family: Not on file   Interpersonal Safety: Not on file   Housing Stability: Not on file          MEDICATIONS:  has a current medication list which includes the following prescription(s): veozah, gabapentin, levothyroxine, acetaminophen, calcium carb-cholecalciferol, fexofenadine hcl, ibuprofen, magnesium, venlafaxine, and venlafaxine.    ROS: 10 point ROS neg other than the symptoms noted above in the HPI.     GENERAL:  energy good, wt stable; denies fevers, chills, night sweats.   HEENT: no dysphagia, odonophagia, diplopia, neck pain  THYROID:  no apparent hyper or hypothyroid symptoms  CV: no chest pain, pressure, palpitations  LUNGS: no SOB, CHARLES, cough, wheezing   ABDOMEN: constipation; denies diarrhea, nausea, abdominal pain  EXTREMITIES: no rashes, ulcers, edema  NEUROLOGY: no headaches, denies changes in vision, tingling, extremitiy numbness   MSK: no muscle aches or pains, weakness  SKIN: some acne, denies rash  : no menses, has hot flashes  PSYCH:  stable mood, no significant anxiety or depression  ENDOCRINE: no heat or cold intolerance     Physical Exam (visual exam)  VS:  no vital signs taken for video visit  CONSTITUTIONAL: healthy, alert and NAD, well dressed, answering questions appropriately  ENT: no nose swelling or nasal discharge, mouth redness or gum changes.  EYES: eyes grossly normal to inspection, conjunctivae and sclerae normal, no exophthalmos or proptosis  THYROID:  no apparent nodules or goiter  LUNGS: no audible wheeze, cough or  visible cyanosis, no visible retractions or increased work of breathing  ABDOMEN: abdomen not evaluated  EXTREMITIES: no hand tremors, limited exam  NEUROLOGY: CN grossly intact, mentation intact and speech normal   SKIN:  no apparent skin lesions, rash, or edema with visualized skin appearance  PSYCH: mentation appears normal, affect normal/bright, judgement and insight intact,   normal speech and appearance well groomed       LABS:     All pertinent notes, labs, and images personally reviewed by me.       A/P:  Encounter Diagnosis   Name Primary?    Hypothyroidism, unspecified type Yes       Comments:  Reviewed health history and hypothyroidism issues.  Difficult to assess hypothyroidism management on video visit, without recent thyroid lab tests  Reviewed and interpreted tests that I previously ordered.   Ordered appropriate tests for the endocrinology disease management.    Management options discussed and implemented after shared medical decision making with the patient.  Hypothyroidism problem is chronic-stable     Plan:  Reviewed general issues with the hypothyroidism diagnosis and management  Discussed lab tests used to assess patient thyroid hormone levels  Reviewed treatment options including levothyroxine medication, ideal dosing     Recommend:  Continue the current levothyroxine 0.05 mg daily dose plan  Sent limited levothyroxine Rx to her local pharmacy  Plan repeat thyroid lab testing soon   Discussed the nearby [facility] lab   Lab orders placed  Monitor for symptom changes  Will need updated levothyroxine Rx after reviewing lab results  Keep focus on diet, exercise, weight management   No indication for GLP1RA med use with mild pre-diabetes   Consider dietician evaluation if concerns about wt gain or weight management  Advise repeat hgbA1c every 6-months  No thyroid U/S imaging needed at this time  Contact me if questions about evaluation and plan    Discussed importance of having a  primary care practitioner for general medicine appointments and also acute care visits.  Addressed patient questions today     The longitudinal plan of care for the endocrine problem(s) were addressed during this visit.  Due to added complexity of care,   we will continue to support the patient and the subsequent management of this condition with ongoing continuity of care.    There are no Patient Instructions on file for this visit.    Future labs ordered today:   Orders Placed This Encounter   Procedures    TSH    T4 free     Radiology/Consults ordered today: None    Total time spent on day of encounter:  26 min    Follow-up:  5/2025, Return    DARRYL Babin MD, MS  Endocrinology  [facility]    CC:  KEM Gerber

## 2024-06-04 ENCOUNTER — TRANSFERRED RECORDS (OUTPATIENT)
Dept: SURGERY | Facility: CLINIC | Age: 54
End: 2024-06-04
Payer: COMMERCIAL

## 2024-06-21 ENCOUNTER — LAB (OUTPATIENT)
Dept: LAB | Facility: CLINIC | Age: 54
End: 2024-06-21
Payer: COMMERCIAL

## 2024-06-21 DIAGNOSIS — E03.9 HYPOTHYROIDISM, UNSPECIFIED TYPE: ICD-10-CM

## 2024-06-21 LAB
T4 FREE SERPL-MCNC: 1.11 NG/DL (ref 0.9–1.7)
TSH SERPL DL<=0.005 MIU/L-ACNC: 4.1 UIU/ML (ref 0.3–4.2)

## 2024-06-21 PROCEDURE — 84443 ASSAY THYROID STIM HORMONE: CPT

## 2024-06-21 PROCEDURE — 36415 COLL VENOUS BLD VENIPUNCTURE: CPT

## 2024-06-21 PROCEDURE — 84439 ASSAY OF FREE THYROXINE: CPT

## 2024-06-22 DIAGNOSIS — E03.9 HYPOTHYROIDISM, UNSPECIFIED TYPE: ICD-10-CM

## 2024-06-22 RX ORDER — LEVOTHYROXINE SODIUM 50 UG/1
TABLET ORAL
Qty: 90 TABLET | Refills: 1 | Status: SHIPPED | OUTPATIENT
Start: 2024-06-22

## 2024-08-10 ENCOUNTER — HEALTH MAINTENANCE LETTER (OUTPATIENT)
Age: 54
End: 2024-08-10

## 2024-12-19 DIAGNOSIS — E03.9 HYPOTHYROIDISM, UNSPECIFIED TYPE: ICD-10-CM

## 2024-12-19 RX ORDER — LEVOTHYROXINE SODIUM 50 UG/1
TABLET ORAL
Qty: 90 TABLET | Refills: 1 | Status: SHIPPED | OUTPATIENT
Start: 2024-12-19

## 2024-12-19 NOTE — TELEPHONE ENCOUNTER
Last Written Prescription Date:  6/22/24  Last Fill Quantity: 90,  # refills: 1   Last office visit: Visit date not found ; last virtual visit: 5/22/2024 with prescribing provider:  Dr. Babin   Future Office Visit: Due back 5/25    Requested Prescriptions   Pending Prescriptions Disp Refills    levothyroxine (SYNTHROID/LEVOTHROID) 50 MCG tablet [Pharmacy Med Name: LEVOTHYROXINE 0.05MG (50MCG) TAB] 90 tablet 1     Sig: TAKE 1 TABLET(50 MCG) BY MOUTH DAILY AS DIRECTED       Thyroid Protocol Passed - 12/19/2024 10:25 AM        Passed - Patient is 12 years or older        Passed - Medication is active on med list        Passed - Recent (12 mo) or future (90 days) visit within the authorizing provider's specialty     The patient must have completed an in-person or virtual visit within the past 12 months or has a future visit scheduled within the next 90 days with the authorizing provider s specialty.  Urgent care and e-visits do not qualify as an office visit for this protocol.          Passed - Medication indicated for associated diagnosis     Medication is associated with one or more of the following diagnoses:  Hypothyroidism  Thyroid stimulating hormone suppression therapy  Thyroid cancer  Acquired atrophy of thyroid          Passed - Normal TSH on file in past 12 months     Recent Labs   Lab Test 06/21/24  1533   TSH 4.10              Passed - No active pregnancy on record     If patient is pregnant or has had a positive pregnancy test, please check TSH.          Passed - No positive pregnancy test in past 12 months     If patient is pregnant or has had a positive pregnancy test, please check TSH.             Refills sent  Linsey Miller RN

## 2025-01-27 ENCOUNTER — LAB REQUISITION (OUTPATIENT)
Dept: LAB | Facility: CLINIC | Age: 55
End: 2025-01-27

## 2025-01-27 DIAGNOSIS — R30.0 DYSURIA: ICD-10-CM

## 2025-01-27 PROCEDURE — 87086 URINE CULTURE/COLONY COUNT: CPT | Performed by: ADVANCED PRACTICE MIDWIFE

## 2025-01-28 LAB — BACTERIA UR CULT: NORMAL

## 2025-05-15 ENCOUNTER — TRANSFERRED RECORDS (OUTPATIENT)
Dept: SURGERY | Facility: CLINIC | Age: 55
End: 2025-05-15
Payer: COMMERCIAL

## 2025-05-24 ENCOUNTER — HEALTH MAINTENANCE LETTER (OUTPATIENT)
Age: 55
End: 2025-05-24

## 2025-05-27 ENCOUNTER — LAB REQUISITION (OUTPATIENT)
Dept: LAB | Facility: CLINIC | Age: 55
End: 2025-05-27

## 2025-05-27 DIAGNOSIS — R63.5 ABNORMAL WEIGHT GAIN: ICD-10-CM

## 2025-05-27 LAB
EST. AVERAGE GLUCOSE BLD GHB EST-MCNC: 120 MG/DL
HBA1C MFR BLD: 5.8 %

## 2025-05-27 PROCEDURE — 83036 HEMOGLOBIN GLYCOSYLATED A1C: CPT | Performed by: OBSTETRICS & GYNECOLOGY

## 2025-06-19 DIAGNOSIS — E03.9 HYPOTHYROIDISM, UNSPECIFIED TYPE: ICD-10-CM

## 2025-06-19 RX ORDER — LEVOTHYROXINE SODIUM 50 UG/1
TABLET ORAL
Qty: 30 TABLET | Refills: 0 | Status: SHIPPED | OUTPATIENT
Start: 2025-06-19

## 2025-06-19 NOTE — TELEPHONE ENCOUNTER
Last Written Prescription Date:  12/19/24  Last Fill Quantity: 90,  # refills: 1   Last office visit: Visit date not found ; last virtual visit: 5/22/2024 with prescribing provider:  Dr. Babin   Future Office Visit: Due back 5/2025     Requested Prescriptions   Pending Prescriptions Disp Refills    levothyroxine (SYNTHROID/LEVOTHROID) 50 MCG tablet [Pharmacy Med Name: LEVOTHYROXINE 0.05MG (50MCG) TAB] 90 tablet 1     Sig: TAKE 1 TABLET(50 MCG) BY MOUTH DAILY AS DIRECTED       Thyroid Protocol Failed - 6/19/2025 11:25 AM        Failed - Recent (12 month) or future (90 days) visit with authorizing provider's specialty (provided they have been seen in the past 15 months)     The patient must have completed an in-person or virtual visit within the past 12 months or has a future visit scheduled within the next 90 days with the authorizing provider s specialty.  Urgent care and e-visits do not qualify as an office visit for this protocol.          Passed - Patient is 12 years or older        Passed - Medication is active on med list and the sig matches. RN to manually verify dose and sig if red X/fail.     If the protocol passes (green check), you do not need to verify med dose and sig.    A prescription matches if they are the same clinical intention.    For Example: once daily and every morning are the same.    The protocol can not identify upper and lower case letters as matching and will fail.     For Example: Take 1 tablet (50 mg) by mouth daily     TAKE 1 TABLET (50 MG) BY MOUTH DAILY    For all fails (red x), verify dose and sig.    If the refill does match what is on file, the RN can still proceed to approve the refill request.       If they do not match, route to the appropriate provider.             Passed - Medication indicated for associated diagnosis     Medication is associated with one or more of the following diagnoses:  Hypothyroidism  Thyroid stimulating hormone suppression therapy  Thyroid  cancer  Acquired atrophy of thyroid          Passed - Normal TSH on file in past 12 months     Recent Labs   Lab Test 06/21/24  1533   TSH 4.10              Passed - No active pregnancy on record     If patient is pregnant or has had a positive pregnancy test, please check TSH.          Passed - No positive pregnancy test in past 12 months     If patient is pregnant or has had a positive pregnancy test, please check TSH.             Courtesy refill sent with note that pt will need appt for add'l refills. Linsey Miller RN